# Patient Record
Sex: FEMALE | Race: WHITE | Employment: UNEMPLOYED | URBAN - METROPOLITAN AREA
[De-identification: names, ages, dates, MRNs, and addresses within clinical notes are randomized per-mention and may not be internally consistent; named-entity substitution may affect disease eponyms.]

---

## 2017-08-07 ENCOUNTER — OFFICE VISIT (OUTPATIENT)
Dept: NEUROLOGY | Age: 52
End: 2017-08-07

## 2017-08-07 VITALS — DIASTOLIC BLOOD PRESSURE: 70 MMHG | SYSTOLIC BLOOD PRESSURE: 111 MMHG

## 2017-08-07 DIAGNOSIS — M48.061 LUMBAR STENOSIS: ICD-10-CM

## 2017-08-07 DIAGNOSIS — R20.2 NUMBNESS AND TINGLING: ICD-10-CM

## 2017-08-07 DIAGNOSIS — H53.9 VISION CHANGES: ICD-10-CM

## 2017-08-07 DIAGNOSIS — R20.0 NUMBNESS AND TINGLING: ICD-10-CM

## 2017-08-07 DIAGNOSIS — M62.81 MUSCLE WEAKNESS: Primary | ICD-10-CM

## 2017-08-07 DIAGNOSIS — M48.02 CERVICAL STENOSIS OF SPINAL CANAL: ICD-10-CM

## 2017-08-07 RX ORDER — OXYCODONE AND ACETAMINOPHEN 10; 325 MG/1; MG/1
TABLET ORAL
Refills: 0 | COMMUNITY
Start: 2017-08-02 | End: 2018-02-19

## 2017-08-07 RX ORDER — MORPHINE SULFATE 30 MG/1
TABLET, FILM COATED, EXTENDED RELEASE ORAL
Refills: 0 | COMMUNITY
Start: 2017-08-02

## 2017-08-07 RX ORDER — ALPRAZOLAM 1 MG/1
TABLET ORAL
Refills: 1 | COMMUNITY
Start: 2017-07-31

## 2017-08-07 RX ORDER — DULOXETIN HYDROCHLORIDE 30 MG/1
30 CAPSULE, DELAYED RELEASE ORAL DAILY
Qty: 30 CAP | Refills: 5 | Status: SHIPPED | OUTPATIENT
Start: 2017-08-07 | End: 2017-09-06 | Stop reason: SDUPTHER

## 2017-08-07 RX ORDER — VENLAFAXINE 75 MG/1
TABLET ORAL
Refills: 1 | COMMUNITY
Start: 2017-07-21

## 2017-08-07 RX ORDER — BACLOFEN 10 MG/1
TABLET ORAL 3 TIMES DAILY
COMMUNITY
End: 2018-02-19

## 2017-08-07 RX ORDER — DEXTROAMPHETAMINE SACCHARATE, AMPHETAMINE ASPARTATE, DEXTROAMPHETAMINE SULFATE AND AMPHETAMINE SULFATE 5; 5; 5; 5 MG/1; MG/1; MG/1; MG/1
TABLET ORAL
Refills: 0 | COMMUNITY
Start: 2017-07-21

## 2017-08-07 RX ORDER — CLONAZEPAM 1 MG/1
TABLET, ORALLY DISINTEGRATING ORAL
Refills: 1 | COMMUNITY
Start: 2017-06-08

## 2017-08-07 RX ORDER — TRAZODONE HYDROCHLORIDE 100 MG/1
TABLET ORAL
Refills: 1 | COMMUNITY
Start: 2017-07-31

## 2017-08-07 RX ORDER — TRETINOIN 0.25 MG/G
CREAM TOPICAL
Refills: 0 | COMMUNITY
Start: 2017-06-01

## 2017-08-07 NOTE — MR AVS SNAPSHOT
Visit Information Date & Time Provider Department Dept. Phone Encounter #  
 8/7/2017  9:00 AM Yue Montoya MD [address] [place] [address] Neurology Clinic [phone] [id number] Upcoming Health Maintenance Date Due Hepatitis C Screening 1965 DTaP/Tdap/Td series (1 - Tdap) 8/3/1984 PAP AKA CERVICAL CYTOLOGY 8/3/1984 BREAST CANCER SCRN MAMMOGRAM 8/3/2013 FOBT Q 1 YEAR AGE 50-75 8/3/2013 INFLUENZA AGE 9 TO ADULT 8/1/2017 Allergies as of 8/7/2017  Never Reviewed Not on File Current Immunizations  Never Reviewed No immunizations on file. Not reviewed this visit You Were Diagnosed With   
  
 Codes Comments Muscle weakness    -  Primary ICD-10-CM: M62.81 ICD-9-CM: 728.87 Numbness and tingling     ICD-10-CM: R20.0, R20.2 ICD-9-CM: 782.0 Cervical stenosis of spinal canal     ICD-10-CM: M48.02 
ICD-9-CM: 723.0 Lumbar stenosis     ICD-10-CM: M48.06 
ICD-9-CM: 724.02 Vision changes     ICD-10-CM: H53.9 ICD-9-CM: 368.9 Vitals BP  
  
  
  
  
  
 111/70 Preferred Pharmacy Pharmacy Name Phone 310 [facility], [facility] 53 91 [address] ([address] [phone] Your Updated Medication List  
  
   
This list is accurate as of: 8/7/17 10:09 AM.  Always use your most recent med list.  
  
  
  
  
 ALPRAZolam 1 mg tablet Commonly known as:  Lollie Jac TK 1 T PO TID PRN  
  
 baclofen 10 mg tablet Commonly known as:  LIORESAL Take  by mouth three (3) times daily. clonazePAM 1 mg disintegrating tablet Commonly known as:  KlonoPIN  
PLACE 1 WAFTER ON TONGUE AND LET DISSOLVE TID PRN  
  
 dextroamphetamine-amphetamine 20 mg tablet Commonly known as:  ADDERALL TK 1 T PO AT 8 AM AND 1 TPO  AT NOON  
  
 DULoxetine 30 mg capsule Commonly known as:  CYMBALTA Take 1 Cap by mouth daily. morphine CR 30 mg CR tablet Commonly known as:  MS CONTIN  
TK 1 T PO BID  
  
 oxyCODONE-acetaminophen  mg per tablet Commonly known as:  PERCOCET 10 TK 1 T PO QD PRN  
  
 traZODone 100 mg tablet Commonly known as:  DESYREL  
TK 2 TS PO QHS  
  
 tretinoin 0.025 % topical cream  
Commonly known as:  RETIN-A  
  
 venlafaxine 75 mg tablet Commonly known as:  [facility] TK 2 T IN THE MORNING AND 1 T PO AT NOON Prescriptions Sent to Pharmacy Refills DULoxetine (CYMBALTA) 30 mg capsule 5 Sig: Take 1 Cap by mouth daily. Class: Normal  
 Pharmacy: [facility] [address] #: [phone] Route: Oral  
  
We Performed the Following ALDOLASE [id number] CPT(R)] CK [54424 CPT(R)]   
 LD [id number] CPT(R)] REFERRAL TO NEUROLOGY [FKT86 Custom] Comments:  
 Please do EMG/NCS of all four extremities To-Do List   
 08/08/2017 Imaging:  MRI BRAIN W WO CONT   
  
 08/08/2017 Lab:  VITAMIN D, 1, 25 DIHYDROXY Referral Information Referral ID Referred By Referred To  
  
 [id number] Tino TOBIAS MD   
   [address] Phone: [phone] Fax: [phone] Visits Status Start Date End Date 1 New Request 8/7/17 8/7/18 If your referral has a status of pending review or denied, additional information will be sent to support the outcome of this decision. Patient Instructions PRESCRIPTION REFILL POLICY [facility] Statement to Patients April 1, 2014 In an effort to ensure the large volume of patient prescription refills is processed in the most efficient and expeditious manner, we are asking our patients to assist us by calling your Pharmacy for all prescription refills, this will include also your  Mail Order Pharmacy.  The pharmacy will contact our office electronically to continue the refill process. Please do not wait until the last minute to call your pharmacy. We need at least 48 hours (2days) to fill prescriptions. We also encourage you to call your pharmacy before going to  your prescription to make sure it is ready. With regard to controlled substance prescription refill requests (narcotic refills) that need to be picked up at our office, we ask your cooperation by providing us with at least 72 hours (3days) notice that you will need a refill. We will not refill narcotic prescription refill requests after 4:00pm on any weekday, Monday through Thursday, or after 2:00pm on Fridays, or on the weekends. We encourage everyone to explore another way of getting your prescription refill request processed using [employer], our patient web portal through our electronic medical record system. [employer] is an efficient and effective way to communicate your medication request directly to the office and  downloadable as an gregorio on your smart phone . [employer] also features a review functionality that allows you to view your medication list as well as leave messages for your physician. Are you ready to get connected? If so please review the attatched instructions or speak to any of our staff to get you set up right away! Thank you so much for your cooperation. Should you have any questions please contact our Practice Administrator. The Physicians and Staff,  [facility] Patient Instructions/Plans: 
· Duloxetine (By mouth) Duloxetine (doo-LOX-e-teen) Treats depression, anxiety, diabetic peripheral neuropathy, fibromyalgia, and chronic muscle or bone pain. This medicine is an SSNRI. Brand Name(s): Cymbalta, DermacinRx DPN Kim Faulkner There may be other brand names for this medicine. When This Medicine Should Not Be Used: This medicine is not right for everyone.  Do not use it if you had an allergic reaction to duloxetine. How to Use This Medicine:  
Capsule, Delayed Release Capsule · Take your medicine as directed. Your dose may need to be changed several times to find what works best for you. · Delayed-release capsule: Swallow the capsule whole. Do not crush, chew, break, or open it. · This medicine should come with a Medication Guide. Ask your pharmacist for a copy if you do not have one. · Missed dose: Take a dose as soon as you remember. If it is almost time for your next dose, wait until then and take a regular dose. Do not take extra medicine to make up for a missed dose. · Store the medicine in a closed container at room temperature, away from heat, moisture, and direct light. Drugs and Foods to Avoid: Ask your doctor or pharmacist before using any other medicine, including over-the-counter medicines, vitamins, and herbal products. · Do not take duloxetine if you have used an MAO inhibitor (MAOI) within the past 14 days. Do not start taking an MAO inhibitor within 5 days of stopping duloxetine. · Some medicines can affect how duloxetine works. Tell your doctor if you are using any of the following: 
¨ Buspirone, cimetidine, ciprofloxacin, enoxacin, fentanyl, lithium, Kimmie's wort, theophylline, tramadol, tryptophan, or warfarin ¨ Amphetamines ¨ Blood pressure medicine ¨ Diuretic (water pill) ¨ Medicine for heart rhythm problems (including flecainide, propafenone, quinidine) ¨ Medicine to treat migraine headaches (including triptans) ¨ NSAID pain or arthritis medicine (including aspirin, celecoxib, diclofenac, ibuprofen, naproxen) ¨ Other medicine to treat depression or mood disorders (including amitriptyline, desipramine, fluoxetine, imipramine, nortriptyline, paroxetine) ¨ Phenothiazine medicine (including thioridazine) · Tell your doctor if you use anything else that makes you sleepy. Some examples are allergy medicine, narcotic pain medicine, and alcohol. · Do not drink alcohol while you are using this medicine. Warnings While Using This Medicine: · Tell your doctor if you are pregnant or breastfeeding, or if you have kidney disease, liver disease, diabetes, digestion problems, glaucoma, heart disease, high or low blood pressure, or problems with urination. Tell your doctor if you smoke or you have a history of seizures, or drug or alcohol addiction. · This medicine may cause the following problems:  
¨ Serious liver problems ¨ Serotonin syndrome (more likely when used with certain other medicines) ¨ Increased risk of bleeding problems ¨ Serious skin reactions ¨ Low sodium levels in the blood · This medicine can increase thoughts of suicide. Tell your doctor right away if you start to feel depressed and have thoughts about hurting yourself. · This medicine can cause changes in your blood pressure. This may make you dizzy or drowsy. Do not drive or do anything that could be dangerous until you know how this medicine affects you. Stand up slowly to avoid falls. · Do not stop using this medicine suddenly. Your doctor will need to slowly decrease your dose before you stop it completely. · Your doctor will check your progress and the effects of this medicine at regular visits. Keep all appointments. · Keep all medicine out of the reach of children. Never share your medicine with anyone. Possible Side Effects While Using This Medicine:  
Call your doctor right away if you notice any of these side effects: · Allergic reaction: Itching or hives, swelling in your face or hands, swelling or tingling in your mouth or throat, chest tightness, trouble breathing · Anxiety, restlessness, fever, fast heartbeat, sweating, muscle spasms, diarrhea, seeing or hearing things that are not there · Blistering, peeling, red skin rash · Confusion, weakness, muscle twitching · Dark urine or pale stools, nausea, vomiting, loss of appetite, stomach pain, yellow skin or eyes · Decrease in how much or how often you urinate · Eye pain, vision changes, seeing halos around lights · Feeling more energetic than usual 
· Lightheadedness, dizziness, or fainting · Unusual moods or behaviors, worsening depression, thoughts about hurting yourself, trouble sleeping · Unusual bleeding or bruising If you notice these less serious side effects, talk with your doctor: · Decrease in appetite or weight · Dry mouth, constipation, mild nausea · Unusual drowsiness, sleepiness, or tiredness If you notice other side effects that you think are caused by this medicine, tell your doctor. Call your doctor for medical advice about side effects. You may report side effects to FDA at 4-871-CCW-8023 © 2017 [facility] Information is for End User's use only and may not be sold, redistributed or otherwise used for commercial purposes. The above information is an  only. It is not intended as medical advice for individual conditions or treatments. Talk to your doctor, nurse or pharmacist before following any medical regimen to see if it is safe and effective for you. Introducing [facility]! Milena Callahan introduces [employer] patient portal. Now you can access parts of your medical record, email your doctor's office, and request medication refills online. 1. In your internet browser, go to [email or web address] 2. Click on the First Time User? Click Here link in the Sign In box. You will see the New Member Sign Up page. 3. Enter your [employer] Access Code exactly as it appears below. You will not need to use this code after youve completed the sign-up process. If you do not sign up before the expiration date, you must request a new code. · [employer] Access Code: [email or web address] Expires: 11/5/2017 10:09 AM 
 
4.  Enter the last four digits of your Social Security Number (xxxx) and Date of Birth (mm/dd/yyyy) as indicated and click Submit. You will be taken to the next sign-up page. 5. Create a FreeDrive ID. This will be your Free[employer] login ID and cannot be changed, so think of one that is secure and easy to remember. 6. Create a [employer] password. You can change your password at any time. 7. Enter your Password Reset Question and Answer. This can be used at a later time if you forget your password. 8. Enter your e-mail address. You will receive e-mail notification when new information is available in [address] 9. Click Sign Up. You can now view and download portions of your medical record. 10. Click the Download Summary menu link to download a portable copy of your medical information. If you have questions, please visit the Frequently Asked Questions section of the [employer] website. Remember, [employer] is NOT to be used for urgent needs. For medical emergencies, dial 911. Now available from your iPhone and Android! Please provide this summary of care documentation to your next provider. Your primary care clinician is listed as Willa Kothari. If you have any questions after today's visit, please call [phone].

## 2017-08-07 NOTE — PATIENT INSTRUCTIONS
10 [facility]   Statement to Patients  April 1, 2014      In an effort to ensure the large volume of patient prescription refills is processed in the most efficient and expeditious manner, we are asking our patients to assist us by calling your Pharmacy for all prescription refills, this will include also your  Mail Order Pharmacy. The pharmacy will contact our office electronically to continue the refill process. Please do not wait until the last minute to call your pharmacy. We need at least 48 hours (2days) to fill prescriptions. We also encourage you to call your pharmacy before going to  your prescription to make sure it is ready. With regard to controlled substance prescription refill requests (narcotic refills) that need to be picked up at our office, we ask your cooperation by providing us with at least 72 hours (3days) notice that you will need a refill. We will not refill narcotic prescription refill requests after 4:00pm on any weekday, Monday through Thursday, or after 2:00pm on Fridays, or on the weekends. We encourage everyone to explore another way of getting your prescription refill request processed using [employer], our patient web portal through our electronic medical record system. [employer] is an efficient and effective way to communicate your medication request directly to the office and  downloadable as an gregorio on your smart phone . [employer] also features a review functionality that allows you to view your medication list as well as leave messages for your physician. Are you ready to get connected? If so please review the attatched instructions or speak to any of our staff to get you set up right away! Thank you so much for your cooperation. Should you have any questions please contact our Practice Administrator.     The Physicians and Staff,  [facility]     Patient Instructions/Plans:  ·   Duloxetine (By mouth) Duloxetine (doo-LOX-e-teen)  Treats depression, anxiety, diabetic peripheral neuropathy, fibromyalgia, and chronic muscle or bone pain. This medicine is an SSNRI. Brand Name(s): Cymbalta, DermacinRx Kevin Cason   There may be other brand names for this medicine. When This Medicine Should Not Be Used: This medicine is not right for everyone. Do not use it if you had an allergic reaction to duloxetine. How to Use This Medicine:   Capsule, Delayed Release Capsule  · Take your medicine as directed. Your dose may need to be changed several times to find what works best for you. · Delayed-release capsule: Swallow the capsule whole. Do not crush, chew, break, or open it. · This medicine should come with a Medication Guide. Ask your pharmacist for a copy if you do not have one. · Missed dose: Take a dose as soon as you remember. If it is almost time for your next dose, wait until then and take a regular dose. Do not take extra medicine to make up for a missed dose. · Store the medicine in a closed container at room temperature, away from heat, moisture, and direct light. Drugs and Foods to Avoid:   Ask your doctor or pharmacist before using any other medicine, including over-the-counter medicines, vitamins, and herbal products. · Do not take duloxetine if you have used an MAO inhibitor (MAOI) within the past 14 days. Do not start taking an MAO inhibitor within 5 days of stopping duloxetine. · Some medicines can affect how duloxetine works.  Tell your doctor if you are using any of the following:  ¨ Buspirone, cimetidine, ciprofloxacin, enoxacin, fentanyl, lithium, Kimmie's wort, theophylline, tramadol, tryptophan, or warfarin  ¨ Amphetamines  ¨ Blood pressure medicine  ¨ Diuretic (water pill)  ¨ Medicine for heart rhythm problems (including flecainide, propafenone, quinidine)  ¨ Medicine to treat migraine headaches (including triptans)  ¨ NSAID pain or arthritis medicine (including aspirin, celecoxib, diclofenac, ibuprofen, naproxen)  ¨ Other medicine to treat depression or mood disorders (including amitriptyline, desipramine, fluoxetine, imipramine, nortriptyline, paroxetine)  ¨ Phenothiazine medicine (including thioridazine)  · Tell your doctor if you use anything else that makes you sleepy. Some examples are allergy medicine, narcotic pain medicine, and alcohol. · Do not drink alcohol while you are using this medicine. Warnings While Using This Medicine:   · Tell your doctor if you are pregnant or breastfeeding, or if you have kidney disease, liver disease, diabetes, digestion problems, glaucoma, heart disease, high or low blood pressure, or problems with urination. Tell your doctor if you smoke or you have a history of seizures, or drug or alcohol addiction. · This medicine may cause the following problems:   ¨ Serious liver problems  ¨ Serotonin syndrome (more likely when used with certain other medicines)  ¨ Increased risk of bleeding problems  ¨ Serious skin reactions  ¨ Low sodium levels in the blood  · This medicine can increase thoughts of suicide. Tell your doctor right away if you start to feel depressed and have thoughts about hurting yourself. · This medicine can cause changes in your blood pressure. This may make you dizzy or drowsy. Do not drive or do anything that could be dangerous until you know how this medicine affects you. Stand up slowly to avoid falls. · Do not stop using this medicine suddenly. Your doctor will need to slowly decrease your dose before you stop it completely. · Your doctor will check your progress and the effects of this medicine at regular visits. Keep all appointments. · Keep all medicine out of the reach of children. Never share your medicine with anyone.   Possible Side Effects While Using This Medicine:   Call your doctor right away if you notice any of these side effects:  · Allergic reaction: Itching or hives, swelling in your face or hands, swelling or tingling in your mouth or throat, chest tightness, trouble breathing  · Anxiety, restlessness, fever, fast heartbeat, sweating, muscle spasms, diarrhea, seeing or hearing things that are not there  · Blistering, peeling, red skin rash  · Confusion, weakness, muscle twitching  · Dark urine or pale stools, nausea, vomiting, loss of appetite, stomach pain, yellow skin or eyes  · Decrease in how much or how often you urinate  · Eye pain, vision changes, seeing halos around lights  · Feeling more energetic than usual  · Lightheadedness, dizziness, or fainting  · Unusual moods or behaviors, worsening depression, thoughts about hurting yourself, trouble sleeping  · Unusual bleeding or bruising  If you notice these less serious side effects, talk with your doctor:   · Decrease in appetite or weight  · Dry mouth, constipation, mild nausea  · Unusual drowsiness, sleepiness, or tiredness  If you notice other side effects that you think are caused by this medicine, tell your doctor. Call your doctor for medical advice about side effects. You may report side effects to FDA at 7-740-FDA-2278  © 2017 [facility] Information is for End User's use only and may not be sold, redistributed or otherwise used for commercial purposes. The above information is an  only. It is not intended as medical advice for individual conditions or treatments. Talk to your doctor, nurse or pharmacist before following any medical regimen to see if it is safe and effective for you.

## 2017-08-07 NOTE — PROGRESS NOTES
Patient says she has lost feeling in both of her legs. Says she has constant numbness and tingling in her arms and hands. She states her symptoms started in 2009. She has had a pain stimulator placed in 2013 for back pain but she feels it has damaged her legs. She states she is out of pain medicine. She has recently missed her appointment with her pain management dr and is unable to be seen again until May.
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
## 2017-08-08 DIAGNOSIS — R20.2 NUMBNESS AND TINGLING: ICD-10-CM

## 2017-08-08 DIAGNOSIS — H53.9 VISION CHANGES: ICD-10-CM

## 2017-08-08 DIAGNOSIS — M48.02 CERVICAL STENOSIS OF SPINAL CANAL: ICD-10-CM

## 2017-08-08 DIAGNOSIS — M48.061 LUMBAR STENOSIS: ICD-10-CM

## 2017-08-08 DIAGNOSIS — M62.81 MUSCLE WEAKNESS: ICD-10-CM

## 2017-08-08 DIAGNOSIS — R20.0 NUMBNESS AND TINGLING: ICD-10-CM

## 2017-08-16 ENCOUNTER — ED HISTORICAL/CONVERTED ENCOUNTER (OUTPATIENT)
Dept: OTHER | Age: 52
End: 2017-08-16

## 2017-08-21 ENCOUNTER — HOSPITAL ENCOUNTER (OUTPATIENT)
Dept: MRI IMAGING | Age: 52
Discharge: HOME | End: 2017-08-21
Attending: PSYCHIATRY & NEUROLOGY

## 2017-08-21 DIAGNOSIS — M48.02 CERVICAL STENOSIS OF SPINAL CANAL: ICD-10-CM

## 2017-08-21 DIAGNOSIS — R20.0 NUMBNESS AND TINGLING: ICD-10-CM

## 2017-08-21 DIAGNOSIS — M62.81 MUSCLE WEAKNESS: ICD-10-CM

## 2017-08-21 DIAGNOSIS — R20.2 NUMBNESS AND TINGLING: ICD-10-CM

## 2017-08-21 DIAGNOSIS — H53.9 VISION CHANGES: ICD-10-CM

## 2017-08-21 DIAGNOSIS — M48.061 LUMBAR STENOSIS: ICD-10-CM

## 2017-08-30 ENCOUNTER — OFFICE VISIT (OUTPATIENT)
Dept: NEUROLOGY | Age: 52
End: 2017-08-30

## 2017-08-30 DIAGNOSIS — M54.12 RADICULOPATHY, CERVICAL: Primary | ICD-10-CM

## 2017-08-30 DIAGNOSIS — M47.27 LUMBOSACRAL RADICULOPATHY DUE TO DEGENERATIVE JOINT DISEASE OF SPINE: ICD-10-CM

## 2017-08-30 DIAGNOSIS — G58.9 ENTRAPMENT NEUROPATHY: ICD-10-CM

## 2017-08-30 NOTE — PROGRESS NOTES
EMG/ NCS Report  \[facility]\"", [address]  Ph: [phone]  FAX: [phone]/ [phone]  Test Date:  2017    Patient: Jai Grajeda : 1965 Physician: Jeremy Roa, Sangeeta Medina MD   Sex: Female Height: ' \" Ref PhysEather Fraction   ID#: [id number] Weight:  lbs. Technician: Sukh Euceda     Patient History / Exam:  CC:NUMBNESS,TINGLING ARMS/LEGS. EMG & NCV Findings:  Evaluation of the left Fibular motor nerve showed normal distal onset latency (3.3 ms), normal amplitude (6.3 mV), normal conduction velocity (B Fib-Ankle, 49 m/s), and normal conduction velocity (Poplt-B Fib, 67 m/s). The right Fibular motor nerve showed normal distal onset latency (2.3 ms), reduced amplitude (1.6 mV), normal conduction velocity (B Fib-Ankle, 46 m/s), and normal conduction velocity (Poplt-B Fib, 59 m/s). The left median motor nerve showed normal distal onset latency (3.9 ms), reduced amplitude (3.9 mV), and normal conduction velocity (Elbow-Wrist, 56 m/s). The right median motor nerve showed normal distal onset latency (4.3 ms), normal amplitude (6.4 mV), and normal conduction velocity (Elbow-Wrist, 53 m/s). The left tibial motor and the right tibial motor nerves showed normal distal onset latency (L4.5, R5.2 ms), normal amplitude (L9.3, R11.2 mV), and normal conduction velocity (Knee-Ankle, L51, R52 m/s). The left ulnar motor nerve showed normal distal onset latency (2.5 ms), reduced amplitude (6.4 mV), normal conduction velocity (B Elbow-Wrist, 65 m/s), and normal conduction velocity (A Elbow-B Elbow, 53 m/s). The right ulnar motor nerve showed prolonged distal onset latency (3.9 ms), normal amplitude (8.3 mV), normal conduction velocity (B Elbow-Wrist, 60 m/s), and decreased conduction velocity (A Elbow-B Elbow, 48 m/s).   The left median sensory, the right median sensory, the left radial sensory, the right radial sensory, the left sural sensory, the right sural sensory, the left ulnar sensory, and the right ulnar sensory nerves showed normal distal peak latency (L3.0, R3.6, L2.2, R2.0, L3.4, R2.8, L3.0, R3.1 ms) and normal amplitude (L65.7, R33.0, L56.1, R62.5, L21.9, R17.3, L26.4, R40.5 µV). All left vs. right side differences were within normal limits. All F Wave latencies were within normal limits. All F Wave left vs. right side latency differences were within normal limits. All H Reflex left vs. right side latency differences were within normal limits. All examined muscles (as indicated in the following table) showed no evidence of electrical instability.         Impression:        ___________________________  Tania Solis IV, MD      Nerve Conduction Studies  Anti Sensory Summary Table     Stim Site NR Peak (ms) Norm Peak (ms) P-T Amp (µV) Norm P-T Amp Site1 Site2 Dist (cm)   Left Median Anti Sensory (2nd Digit)  32.8°C   Wrist    3.0 <4 65.7 >13 Wrist 2nd Digit 14.0   Right Median Anti Sensory (2nd Digit)  31.1°C   Wrist    3.6 <4 33.0 >13 Wrist 2nd Digit 14.0   Elbow    3.6  34.6  Elbow Wrist 0.0   Left Radial Anti Sensory (Base 1st Digit)  31.6°C   Wrist    2.2 <2.8 56.1 >11 Wrist Base 1st Digit 10.0   Right Radial Anti Sensory (Base 1st Digit)  31.5°C   Wrist    2.0 <2.8 62.5 >11 Wrist Base 1st Digit 10.0   Left Sural Anti Sensory (Lat Mall)  32.5°C   Calf    3.4 <4.5 21.9 >4.0 Calf Lat Mall 14.0   Site 2    3.4  20.8       Right Sural Anti Sensory (Lat Mall)  33.7°C   Calf    2.8 <4.5 17.3 >4.0 Calf Lat Mall 14.0   Site 2    2.9  18.4       Left Ulnar Anti Sensory (5th Digit)  32.1°C   Wrist    3.0 <4.0 26.4 >9 Wrist 5th Digit 14.0   Right Ulnar Anti Sensory (5th Digit)  31.5°C   Wrist    3.1 <4.0 40.5 >9 Wrist 5th Digit 14.0     Motor Summary Table     Stim Site NR Onset (ms) Norm Onset (ms) O-P Amp (mV) Norm O-P Amp Amp (Prev) (%) Site1 Site2 Dist (cm) Gordy (m/s) Norm Gordy (m/s)   Left Fibular Motor (Ext Dig Brev)  31.8°C   Ankle    3.3 <6.5 6. 3 >2.6 100.0 Ankle Ext Dig Brev 8.0     B Fib    9.2  4.8  76.2 B Fib Ankle 29.0 49 >38   Poplt    10.7  4.8  100.0 Poplt B Fib 10.0 67 >42   Right Fibular Motor (Ext Dig Brev)  33°C   Ankle    2.3 <6.5 1.6 >2.6 100.0 Ankle Ext Dig Brev 8.0     B Fib    8.4  1.1  68.8 B Fib Ankle 28.0 46 >38   Poplt    10.1  1.0  90.9 Poplt B Fib 10.0 59 >42   Left Median Motor (Abd Poll Brev)  31.2°C   Wrist    3.9 <4.5 3.9 >4.1 100.0 Wrist Abd Poll Brev 8.0     Elbow    7.3  2.7  69.2 Elbow Wrist 19.0 56 >49   Right Median Motor (Abd Poll Brev)  31.5°C   Wrist    4.3 <4.5 6.4 >4.1 100.0 Wrist Abd Poll Brev 8.0     Elbow    8.2  5.6  87.5 Elbow Wrist 20.5 53 >49   Left Tibial Motor (Abd Corbett Brev)  31.1°C   Ankle    4.5 <6.1 9.3 >5.3 100.0 Ankle Abd Corbett Brev 8.0     Knee    11.6  7.6  81.7 Knee Ankle 36.0 51 >39   Right Tibial Motor (Abd Corbett Brev)  32°C   Ankle    5.2 <6.1 11.2 >5.3 100.0 Ankle Abd Corbett Brev 8.0     Knee    12.3  9.3  83.0 Knee Ankle 37.0 52 >39   Left Ulnar Motor (Abd Dig Minimi)  30.9°C   Wrist    2.5 <3.1 6.4 >7.0 100.0 Wrist Abd Dig Minimi 8.0  >50   B Elbow    5.6  6.0  93.8 B Elbow Wrist 20.0 65 >50   A Elbow    7.5  6.0  100.0 A Elbow B Elbow 10.0 53 >50   Right Ulnar Motor (Abd Dig Minimi)  30.6°C   Wrist    3.9 <3.1 8.3 >7.0 100.0 Wrist Abd Dig Minimi 8.0  >50   B Elbow    6.9  8.7  104.8 B Elbow Wrist 18.0 60 >50   A Elbow    9.0  8.4  96.6 A Elbow B Elbow 10.0 48 >50     F Wave Studies     NR F-Lat (ms) Lat Norm (ms) L-R F-Lat (ms) L-R Lat Norm   Left Tibial (Mrkrs) (Abd Hallucis)  30.8°C      44.67 <56 4.67 <5.7   Right Tibial (Mrkrs) (Abd Hallucis)  31.8°C      40.00 <56 4.67 <5.7   Left Ulnar (Mrkrs) (Abd Dig Min)  30.5°C      26.51 <32 0.00 <2.5   Right Ulnar (Mrkrs) (Abd Dig Min)  30.2°C      26.51 <32 0.00 <2.5     H Reflex Studies     NR H-Lat (ms) L-R H-Lat (ms) L-R Lat Norm   Left Tibial (Gastroc)  30.5°C      40.00 0.00 <2.0   Right Tibial (Gastroc)  33.6°C      40.00 0.00 <2.0     EMG Side Muscle Nerve Root Ins Act Fibs Psw Recrt Duration Amp Poly Comment   Right Abd Poll Brev Median C8-T1 Nml Nml Nml Nml Nml Nml Nml    Right 1stDorInt Ulnar C8-T1 Nml Nml Nml Nml Nml Nml Nml    Right FlexCarpiUln Ulnar C8,T1 Nml Nml Nml Nml Nml Nml Nml    Right BrachioRad Radial C5-6 Nml Nml Nml Nml Nml Nml Nml    Right Triceps Radial C6-7-8 Nml Nml Nml Nml Nml Nml Nml    Right Biceps Musculocut C5-6 Nml Nml Nml Nml Nml Nml Nml    Right Deltoid Axillary C5-6 Nml Nml Nml Nml Nml Nml Nml    Right Ext Dig Brev Dp Br Peron L5, S1 Nml Nml Nml Nml Nml Nml Nml    Right AntTibialis Dp Br Peron L4-5 Nml Nml Nml Nml Nml Nml Nml    Right MedGastroc Tibial S1-2 Nml Nml Nml Nml Nml Nml Nml    Right VastusMed Femoral L2-4 Nml Nml Nml Nml Nml Nml Nml    Right BicepsFemL Sciatic L5-S2 Nml Nml Nml Nml Nml Nml Nml    Left Ext Dig Brev Dp Br Peron L5, S1 Nml Nml Nml Nml Nml Nml Nml    Left AntTibialis Dp Br Peron L4-5 Nml Nml Nml Nml Nml Nml Nml    Left MedGastroc Tibial S1-2 Nml Nml Nml Nml Nml Nml Nml    Left VastusMed Femoral L2-4 Nml Nml Nml Nml Nml Nml Nml    Left BicepsFemL Sciatic L5-S2 Nml Nml Nml Nml Nml Nml Nml    Left Mid Lumb Parasp Rami L4,5 Nml Nml Nml Nml Nml Nml Nml    Right Lower Lumb Parasp Rami L5,S1 Nml Nml Nml Nml Nml Nml Nml    Left Mid Cerv Parasp Rami C5,6 Nml Nml Nml Nml Nml Nml Nml    Right Mid Cerv Parasp Rami C5,6 Nml Nml Nml Nml Nml Nml Nml    Left 1stDorInt Ulnar C8-T1 Nml Nml Nml Nml Nml Nml Nml    Left BrachioRad Radial C5-6 Nml Nml Nml Nml Nml Nml Nml    Left Triceps Radial C6-7-8 Nml Nml Nml Nml Nml Nml Nml    Left Biceps Musculocut C5-6 Nml Nml Nml Nml Nml Nml Nml    Left Deltoid Axillary C5-6 Nml Nml Nml Nml Nml Nml Nml                Nerve Conduction Studies  Anti Sensory Left/Right Comparison     Stim Site L Lat (ms) R Lat (ms) L-R Lat (ms) L Amp (µV) R Amp (µV) L-R Amp (%) Site1 Site2 L Gordy (m/s) R Gordy (m/s) L-R Gordy (m/s)   Median Anti Sensory (2nd Digit)  32.8°C   Wrist 2.5 3.0 0.5 65.7 33.0 49.8 Wrist 2nd Digit 56 47 9   Radial Anti Sensory (Base 1st Digit)  31.6°C   Wrist 1.4 1.6 0.2 56.1 62.5 10.2 Wrist Base 1st Digit 71 63 8   Sural Anti Sensory (Lat Mall)  32.5°C   Calf 2.9 2.1 0.8 21.9 17.3 21.0 Calf Lat Mall 48 67 19   Site 2 2.7 2.3 0.4 20.8 18.4 11.5        Ulnar Anti Sensory (5th Digit)  32.1°C   Wrist 2.5 2.5 0.0 26.4 40.5 34.8 Wrist 5th Digit 56 56 0     Motor Left/Right Comparison     Stim Site L Lat (ms) R Lat (ms) L-R Lat (ms) L Amp (mV) R Amp (mV) L-R Amp (%) Site1 Site2 L Gordy (m/s) R Gordy (m/s) L-R Gordy (m/s)   Fibular Motor (Ext Dig Brev)  31.8°C   Ankle 3.3 2.3 1.0 6.3 1.6 74.6 Ankle Ext Dig Brev      B Fib 9.2 8.4 0.8 4.8 1.1 77.1 B Fib Ankle 49 46 3   Poplt 10.7 10.1 0.6 4.8 1.0 79.2 Poplt B Fib 67 59 8   Median Motor (Abd Poll Brev)  31.2°C   Wrist 3.9 4.3 0.4 3.9 6.4 39.1 Wrist Abd Poll Brev      Elbow 7.3 8.2 0.9 2.7 5.6 51.8 Elbow Wrist 56 53 3   Tibial Motor (Abd Corbett Brev)  31.1°C   Ankle 4.5 5.2 0.7 9.3 11.2 17.0 Ankle Abd Corbett Brev      Knee 11.6 12.3 0.7 7.6 9.3 18.3 Knee Ankle 51 52 1   Ulnar Motor (Abd Dig Minimi)  30.9°C   Wrist 2.5 3.9 1.4 6.4 8.3 22.9 Wrist Abd Dig Minimi      B Elbow 5.6 6.9 1.3 6.0 8.7 31.0 B Elbow Wrist 65 60 5   A Elbow 7.5 9.0 1.5 6.0 8.4 28.6 A Elbow B Elbow 53 48 5         Waveforms:
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
## 2017-09-06 ENCOUNTER — OFFICE VISIT (OUTPATIENT)
Dept: NEUROLOGY | Age: 52
End: 2017-09-06

## 2017-09-06 VITALS — RESPIRATION RATE: 20 BRPM | DIASTOLIC BLOOD PRESSURE: 64 MMHG | WEIGHT: 162.4 LBS | SYSTOLIC BLOOD PRESSURE: 128 MMHG

## 2017-09-06 DIAGNOSIS — H53.9 VISION CHANGES: ICD-10-CM

## 2017-09-06 DIAGNOSIS — M48.061 LUMBAR STENOSIS: ICD-10-CM

## 2017-09-06 DIAGNOSIS — M62.81 MUSCLE WEAKNESS: ICD-10-CM

## 2017-09-06 DIAGNOSIS — M48.02 CERVICAL STENOSIS OF SPINAL CANAL: ICD-10-CM

## 2017-09-06 DIAGNOSIS — R20.0 NUMBNESS AND TINGLING: Primary | ICD-10-CM

## 2017-09-06 DIAGNOSIS — R20.2 NUMBNESS AND TINGLING: Primary | ICD-10-CM

## 2017-09-06 RX ORDER — NAPROXEN SODIUM 220 MG
220 TABLET ORAL 2 TIMES DAILY WITH MEALS
COMMUNITY

## 2017-09-06 RX ORDER — DULOXETIN HYDROCHLORIDE 30 MG/1
30 CAPSULE, DELAYED RELEASE ORAL DAILY
Qty: 30 CAP | Refills: 5 | Status: SHIPPED | OUTPATIENT
Start: 2017-09-06 | End: 2018-02-19

## 2017-09-06 NOTE — PROGRESS NOTES
Neurology Progress Note    Patient ID:  Ish Lakhani  [id number]  45 y.o.  1965    HISTORY PROVIDED BY:  Patient      Chief Complaint: Numbness  Subjective:    Ms. Anahi Kerns is here for follow up today of numbness and pain. Patient had an EMG/NCS since her last visit of all 4 extremities. This was negative. She did have decreased recruitment due to lack of effort on the muscle part of the examination. Patient unable to get an MRI due to her pain stimulator. Patient reports her last imaging with myelogram in 2015 which she had after losing some bowel and bladder function. She has had surgery in the past.  She did have post pain stimulator placed by Dr. Dillon Woo several years ago. This did not help her at all. She feels like it gave her nerve damage in her legs. Last visit I did prescribe Cymbalta but the pharmacist would not let her have it saying that she needed to check with me first.  Patient is taking Effexor 150 mg twice daily, Klonopin, and Xanax. Discussed with her that we does have to do a low-dose of the Cymbalta. Also discussed serotonin syndrome and what to look for. Patient reports she still having issues where she will have complete numbness in her arms and legs and they will get numb to the point that she will fall. She does not understand how her EMG could be normal.  Patient is also trying to establish with a new pain management doctor. She states that she is out of her oxycodone and going to run out of her morphine. She sees them on the 13th. Patient continues to report vision changes. She feels like her distance reading is impaired.     Objective:   ROS:  Per HPI-  Otherwise 12 point ROS was negative    Meds:  Current Outpatient Prescriptions on File Prior to Visit   Medication Sig Dispense Refill    ALPRAZolam (XANAX) 1 mg tablet TK 1 T PO TID PRN  1    morphine CR (MS CONTIN) 30 mg CR tablet TK 1 T PO BID  0    traZODone (DESYREL) 100 mg tablet TK 2 TS PO QHS  1    venlafaxine (EFFEXOR) 75 mg tablet TK 2 T IN THE MORNING AND 1 T PO AT NOON  1    dextroamphetamine-amphetamine (ADDERALL) 20 mg tablet TK 1 T PO AT 8 AM AND 1 TPO  AT NOON  0    baclofen (LIORESAL) 10 mg tablet Take  by mouth three (3) times daily.  clonazePAM (KLONOPIN) 1 mg disintegrating tablet PLACE 1 WAFTER ON TONGUE AND LET DISSOLVE TID PRN  1    oxyCODONE-acetaminophen (PERCOCET 10)  mg per tablet TK 1 T PO QD PRN  0    tretinoin (RETIN-A) 0.025 % topical cream   0    DULoxetine (CYMBALTA) 30 mg capsule Take 1 Cap by mouth daily. 30 Cap 5     No current facility-administered medications on file prior to visit.         Imaging:  No new imaging  EMG/NCS: Negative  Reviewed records in [employer] tab today    Lab Review   Results for orders placed or performed during the hospital encounter of 03/02/10   CULTURE, BLOOD, PAIRED   Result Value Ref Range    Specimen Description: BLOOD     Special Requests: NO SPECIAL REQUESTS     Culture result: NO GROWTH 5 DAYS     Report Status [id number] FINAL    HGB & HCT   Result Value Ref Range    HGB 9.9 (L) 11.5 - 16.0 g/dL    HCT 29.7 (L) 35.0 - 47.0 %   HGB & HCT   Result Value Ref Range    HGB 8.5 (L) 11.5 - 16.0 g/dL    HCT 26.1 (L) 35.0 - 47.0 %   HGB & HCT   Result Value Ref Range    HGB 9.7 (L) 11.5 - 16.0 g/dL    HCT 29.5 (L) 35.0 - 47.0 %   URINALYSIS W/ REFLEX CULTURE   Result Value Ref Range    Color YELLOW     Appearance CLEAR     Specific gravity 1.015 1.003 - 1.030      pH (UA) 6.0 5.0 - 8.0      Protein NEGATIVE  NEGATIVE MG/DL    Glucose NEGATIVE  NEGATIVE MG/DL    Ketone NEGATIVE  NEGATIVE MG/DL    Bilirubin NEGATIVE  NEGATIVE    Blood NEGATIVE  NEGATIVE    Urobilinogen 0.2 0.2 - 1.0 EU/DL    Nitrites NEGATIVE  NEGATIVE    Leukocyte Esterase NEGATIVE  NEGATIVE    UA:UC IF INDICATED CULTURE NOT INDICATED BY UA RESULT     WBC 0-4 0 - 4 /HPF    RBC 0-3 0 - 5 /HPF    Epithelial cells 0-5 0 - 5 /LPF    Bacteria NEGATIVE  NEGATIVE /HPF    Hyaline cast 0-2 0 - 2   INFLUENZA A & B AG   Result Value Ref Range    Influenza A Antigen NEGATIVE  NEGATIVE    Influenza B Antigen NEGATIVE  NEGATIVE   METABOLIC PANEL, COMPREHENSIVE   Result Value Ref Range    Sodium 143 136 - 145 MMOL/L    Potassium 3.4 (L) 3.5 - 5.1 MMOL/L    Chloride 105 97 - 108 MMOL/L    CO2 30 21 - 32 MMOL/L    Anion gap 8 5 - 15 mmol/L    Glucose 141 (H) 50 - 100 MG/DL    BUN 4 (L) 6 - 20 MG/DL    Creatinine 0.9 0.6 - 1.3 MG/DL    BUN/Creatinine ratio 4 (L) 12 - 20      GFR est AA >60 >60 ml/min/1.73m2    GFR est non-AA >60 >60 ml/min/1.73m2    Calcium 8.0 (L) 8.5 - 10.1 MG/DL    Bilirubin, total 0.2 0.2 - 1.0 MG/DL    ALT (SGPT) 32 30 - 65 U/L    AST (SGOT) 26 15 - 37 U/L    Alk. phosphatase 56 50 - 136 U/L    Protein, total 5.2 (L) 6.4 - 8.2 g/dL    Albumin 2.7 (L) 3.5 - 5.0 g/dL    Globulin 2.5 2.0 - 4.0 g/dL    A-G Ratio 1.1 1.1 - 2.2     CBC W/O DIFF   Result Value Ref Range    WBC 10.9 3.6 - 11.0 K/uL    RBC 2.59 (L) 3.80 - 5.20 M/uL    HGB 8.3 (L) 11.5 - 16.0 g/dL    HCT 25.1 (L) 35.0 - 47.0 %    MCV 96.9 80.0 - 99.0 FL    MCH 32.0 26.0 - 34.0 PG    MCHC 33.1 30.0 - 36.5 g/dL    RDW 13.4 11.5 - 14.5 %    PLATELET 754 919 - 119 K/uL   CBC W/ AUTOMATED DIFF   Result Value Ref Range    WBC 10.6 3.6 - 11.0 K/uL    RBC 2.70 (L) 3.80 - 5.20 M/uL    HGB 8.7 (L) 11.5 - 16.0 g/dL    HCT 25.7 (L) 35.0 - 47.0 %    MCV 95.2 80.0 - 99.0 FL    MCH 32.2 26.0 - 34.0 PG    MCHC 33.9 30.0 - 36.5 g/dL    RDW 12.7 11.5 - 14.5 %    PLATELET 158 328 - 319 K/uL    NEUTROPHILS 67 32 - 75 %    LYMPHOCYTES 15 12 - 49 %    MONOCYTES 13 5 - 13 %    EOSINOPHILS 5 0 - 7 %    BASOPHILS 0 0 - 1 %    ABS. NEUTROPHILS 7.1 1.8 - 8.0 K/UL    ABS. LYMPHOCYTES 1.6 0.8 - 3.5 K/UL    ABS. MONOCYTES 1.4 (H) 0.0 - 1.0 K/UL    ABS. EOSINOPHILS 0.5 (H) 0.0 - 0.4 K/UL    ABS. BASOPHILS 0.0 0.0 - 0.1 K/UL     EMG/NCS:  Findings:    1.   EMG assessment of all 4 extremities and affiliated paraspinals failed to confidently delineate acute denervation or chronic denervation/reinnervation features or myopathic potentials. The uniform theme among motor recruitment was no realistic or sustained recruitment effort obtained at any of the muscle encounters. At best, muscle recruitment was easily less than 20%. 2.  Nerve conduction portion was normal except for the isolated finding of slight delay in the right ulnar motor latency at the wrist and diminished nerve conduction velocity in the same ulnar nerve above elbow segment with maintenance of motor amplitude above and below elbow. Impression:    1. No clearly defined neuropathic or myopathic disease uncovered with suboptimal patient effort at all muscles tested. 2.  Subtle conduction block of right ulnar motor nerve at the wrist and a possible suggestion of right ulnar nerve compression in the above elbow segment of uncertain if any significance. Exam:  Visit Vitals    /64    Resp 20    Wt 73.7 kg (162 lb 6.4 oz)     Gen: Well developed  CV: RRR  Lungs: non labored breathing  Abd: non distending  Neuro: A&O x 3, no dysarthria or aphasia  CN II-XII: Right pupil 5 mm, left pupil 3 mm this is the same in light and dark, EOMI, face symmetric, tongue/palate midline  Motor: strength 5/5 all four ext  Sensory: intact to LT  Gait: Wide-based    Assessment:   Nadeem Soto is a 45 y.o. female who presents for follow up of generalized numbness. 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  Patient is also having balance issues secondary to this. Since we are unable to do an MRI to evaluate for demyelinating disease, will do CT scans. Will do a CT of the head, C-spine, and L-spine. The lumbar spine is to evaluate for her back pain and balance issues. We will also start patient on Cymbalta today. On neuro exam patient does have anisocoria but it is the same in light and dark. I suspect this is physiologic but will do a CT of the head to evaluate for any aneurysm.     Plan: 1.  CT head/C-spine/L-spine  2. Start Cymbalta 30 mg daily. Side effects discussed. Serotonin syndrome discussed. Information given to patient. 3.  Encouraged patient to have a follow-up with her eye doctor  4. Encouraged patient to keep her pain management appointment as I do not do narcotics  5. Evaluate anisocoria with CT of the head. Patient does have a history of head trauma. Follow-up 6 weeks    Signed:  Susan Lozada MD  9/6/2017    This note was created using voice recognition software. Despite editing, there may be syntax errors. This note will not be viewable in [address]

## 2017-09-06 NOTE — MR AVS SNAPSHOT
Visit Information Date & Time Provider Department Dept. Phone Encounter #  
 9/6/2017 11:40 AM [facility] [employer] [phone] [id number] Upcoming Health Maintenance Date Due Hepatitis C Screening 1965 DTaP/Tdap/Td series (1 - Tdap) 8/3/1984 PAP AKA CERVICAL CYTOLOGY 8/3/1984 BREAST CANCER SCRN MAMMOGRAM 8/3/2013 FOBT Q 1 YEAR AGE 50-75 8/3/2013 INFLUENZA AGE 9 TO ADULT 8/1/2017 Allergies as of 9/6/2017  Review Complete On: 8/30/2017 By: Elizabeth Bruce MD  
 Not on File Current Immunizations  Never Reviewed No immunizations on file. Not reviewed this visit You Were Diagnosed With   
  
 Codes Comments Muscle weakness     ICD-10-CM: M62.81 ICD-9-CM: 728.87 Numbness and tingling     ICD-10-CM: R20.0, R20.2 ICD-9-CM: 782.0 Cervical stenosis of spinal canal     ICD-10-CM: M48.02 
ICD-9-CM: 723.0 Lumbar stenosis     ICD-10-CM: M48.06 
ICD-9-CM: 724.02 Vision changes     ICD-10-CM: H53.9 ICD-9-CM: 368.9 Vitals BP Resp Weight(growth percentile) 128/64 20 162 lb 6.4 oz (73.7 kg) Vitals History Preferred Pharmacy Pharmacy Name Phone [facility], [address] ([phone] Your Updated Medication List  
  
   
This list is accurate as of: 9/6/17 12:27 PM.  Always use your most recent med list.  
  
  
  
  
 ALEVE 220 mg tablet Generic drug:  naproxen sodium Take 220 mg by mouth two (2) times daily (with meals). ALPRAZolam 1 mg tablet Commonly known as:  Don  TK 1 T PO TID PRN  
  
 baclofen 10 mg tablet Commonly known as:  LIORESAL Take  by mouth three (3) times daily. clonazePAM 1 mg disintegrating tablet Commonly known as:  KlonoPIN  
PLACE 1 WAFTER ON TONGUE AND LET DISSOLVE TID PRN  
  
 dextroamphetamine-amphetamine 20 mg tablet Commonly known as:  ADDERALL TK 1 T PO AT 8 AM AND 1 TPO  AT NOON  
  
 DULoxetine 30 mg capsule Commonly known as:  CYMBALTA Take 1 Cap by mouth daily. morphine CR 30 mg CR tablet Commonly known as:  MS CONTIN  
TK 1 T PO BID  
  
 oxyCODONE-acetaminophen  mg per tablet Commonly known as:  PERCOCET 10 TK 1 T PO QD PRN  
  
 traZODone 100 mg tablet Commonly known as:  DESYREL  
TK 2 TS PO QHS  
  
 tretinoin 0.025 % topical cream  
Commonly known as:  RETIN-A  
  
 venlafaxine 75 mg tablet Commonly known as:  [facility] TK 2 T IN THE MORNING AND 1 T PO AT NOON Prescriptions Sent to Pharmacy Refills DULoxetine (CYMBALTA) 30 mg capsule 5 Sig: Take 1 Cap by mouth daily. Class: Normal  
 Pharmacy: [facility] [address] #: [phone] Route: Oral  
  
To-Do List   
 09/07/2017 Imaging:  CT HEAD WO CONT   
  
 09/07/2017 Imaging:  CT SPINE CERV WO CONT   
  
 09/07/2017 Imaging:  CT SPINE LUMB WO CONT Patient Instructions Keyanna Serrano [id number] What is a living will? A living will is a legal form you use to write down the kind of care you want at the end of your life. It is used by the health professionals who will treat you if you aren't able to decide for yourself. If you put your wishes in writing, your loved ones and others will know what kind of care you want. They won't need to guess. This can ease your mind and be helpful to others. A living will is not the same as an estate or property will. An estate will explains what you want to happen with your money and property after you die. Is a living will a legal document? A living will is a legal document. Each state has its own laws about living newman. If you move to another state, make sure that your living will is legal in the state where you now live.  Or you might use a universal form that has been approved by many states. This kind of form can sometimes be completed and stored online. Your electronic copy will then be available wherever you have a connection to the Internet. In most cases, doctors will respect your wishes even if you have a form from a different state. · You don't need an  to complete a living will. But legal advice can be helpful if your state's laws are unclear, your health history is complicated, or your family can't agree on what should be in your living will. · You can change your living will at any time. Some people find that their wishes about end-of-life care change as their health changes. · In addition to making a living will, think about completing a medical power of  form. This form lets you name the person you want to make end-of-life treatment decisions for you (your \"health care agent\") if you're not able to. Many hospitals and nursing homes will give you the forms you need to complete a living will and a medical power of . · Your living will is used only if you can't make or communicate decisions for yourself anymore. If you become able to make decisions again, you can accept or refuse any treatment, no matter what you wrote in your living will. · Your state may offer an online registry. This is a place where you can store your living will online so the doctors and nurses who need to treat you can find it right away. What should you think about when creating a living will? Talk about your end-of-life wishes with your family members and your doctor. Let them know what you want. That way the people making decisions for you won't be surprised by your choices. Think about these questions as you make your living will: · Do you know enough about life support methods that might be used?  If not, talk to your doctor so you know what might be done if you can't breathe on your own, your heart stops, or you're unable to swallow. · What things would you still want to be able to do after you receive life-support methods? Would you want to be able to walk? To speak? To eat on your own? To live without the help of machines? · If you have a choice, where do you want to be cared for? In your home? At a hospital or nursing home? · Do you want certain Holiness practices performed if you become very ill? · If you have a choice at the end of your life, where would you prefer to die? At home? In a hospital or nursing home? Somewhere else? · Would you prefer to be buried or cremated? · Do you want your organs to be donated after you die? What should you do with your living will? · Make sure that your family members and your health care agent have copies of your living will. · Give your doctor a copy of your living will to keep in your medical record. If you have more than one doctor, make sure that each one has a copy. · You may want to put a copy of your living will where it can be easily found. Where can you learn more? Go to http://mahi-mara.info/. Enter Y413 in the search box to learn more about \"Learning About Living Perromike. \" Current as of: August 8, 2016 Content Version: 11.3 © 3608-1278 [employer]. Care instructions adapted under license by [employer] (which disclaims liability or warranty for this information). If you have questions about a medical condition or this instruction, always ask your healthcare professional. [place] [address] any warranty or liability for your use of this information. Advance Directives: Care Instructions Your Care Instructions An advance directive is a legal way to state your wishes at the end of your life. It tells your family and your doctor what to do if you can no longer say what you want. There are two main types of advance directives. You can change them any time that your wishes change. · A living will tells your family and your doctor your wishes about life support and other treatment. · A durable power of  for health care lets you name a person to make treatment decisions for you when you can't speak for yourself. This person is called a health care agent. If you do not have an advance directive, decisions about your medical care may be made by a doctor or a  who doesn't know you. It may help to think of an advance directive as a gift to the people who care for you. If you have one, they won't have to make tough decisions by themselves. Follow-up care is a key part of your treatment and safety. Be sure to make and go to all appointments, and call your doctor if you are having problems. It's also a good idea to know your test results and keep a list of the medicines you take. How can you care for yourself at home? · Discuss your wishes with your loved ones and your doctor. This way, there are no surprises. · Many states have a unique form. Or you might use a universal form that has been approved by many states. This kind of form can sometimes be completed and stored online. Your electronic copy will then be available wherever you have a connection to the Internet. In most cases, doctors will respect your wishes even if you have a form from a different state. · You don't need a  to do an advance directive. But you may want to get legal advice. · Think about these questions when you prepare an advance directive: ¨ Who do you want to make decisions about your medical care if you are not able to? Many people choose a family member or close friend. ¨ Do you know enough about life support methods that might be used? If not, talk to your doctor so you understand. ¨ What are you most afraid of that might happen?  You might be afraid of having pain, losing your independence, or being kept alive by machines. ¨ Where would you prefer to die? Choices include your home, a hospital, or a nursing home. ¨ Would you like to have information about hospice care to support you and your family? ¨ Do you want to donate organs when you die? ¨ Do you want certain Gnosticist practices performed before you die? If so, put your wishes in the advance directive. · Read your advance directive every year, and make changes as needed. When should you call for help? Be sure to contact your doctor if you have any questions. Where can you learn more? Go to http://mahiAerie Pharmaceuticalsmara.info/. Enter R264 in the search box to learn more about \"Advance Directives: Care Instructions. \" Current as of: November 17, 2016 Content Version: 11.3 © 7383-6102 [employer]. Care instructions adapted under license by [employer] (which disclaims liability or warranty for this information). If you have questions about a medical condition or this instruction, always ask your healthcare professional. [place] [address] any warranty or liability for your use of this information. PRESCRIPTION REFILL POLICY [facility] Statement to Patients April 1, 2014 In an effort to ensure the large volume of patient prescription refills is processed in the most efficient and expeditious manner, we are asking our patients to assist us by calling your Pharmacy for all prescription refills, this will include also your  Mail Order Pharmacy. The pharmacy will contact our office electronically to continue the refill process. Please do not wait until the last minute to call your pharmacy. We need at least 48 hours (2days) to fill prescriptions. We also encourage you to call your pharmacy before going to  your prescription to make sure it is ready. With regard to controlled substance prescription refill requests (narcotic refills) that need to be picked up at our office, we ask your cooperation by providing us with at least 72 hours (3days) notice that you will need a refill. We will not refill narcotic prescription refill requests after 4:00pm on any weekday, Monday through Thursday, or after 2:00pm on Fridays, or on the weekends. We encourage everyone to explore another way of getting your prescription refill request processed using [employer], our patient web portal through our electronic medical record system. [employer] is an efficient and effective way to communicate your medication request directly to the office and  downloadable as an gregorio on your smart phone . [employer] also features a review functionality that allows you to view your medication list as well as leave messages for your physician. Are you ready to get connected? If so please review the attatched instructions or speak to any of our staff to get you set up right away! Thank you so much for your cooperation. Should you have any questions please contact our Practice Administrator. The Physicians and Staff,  [facility] Introducing [facility]! Dear Norman Reyna: Thank you for requesting a [employer] account. Our records indicate that you already have an active [employer] account. You can access your account anytime at [email or web address] Did you know that you can access your hospital and ER discharge instructions at any time in [employer]ger? You can also review all of your test results from your hospital stay or ER visit. Additional Information If you have questions, please visit the Frequently Asked Questions section of the [employer] website at [email or web address] Remember, [employer]ger is NOT to be used for urgent needs. For medical emergencies, dial 911. Now available from your iPhone and Android! Please provide this summary of care documentation to your next provider. Your primary care clinician is listed as Colt Greene. If you have any questions after today's visit, please call [phone].

## 2017-09-06 NOTE — PATIENT INSTRUCTIONS
Learning About Living Luiz  What is a living will? A living will is a legal form you use to write down the kind of care you want at the end of your life. It is used by the health professionals who will treat you if you aren't able to decide for yourself. If you put your wishes in writing, your loved ones and others will know what kind of care you want. They won't need to guess. This can ease your mind and be helpful to others. A living will is not the same as an estate or property will. An estate will explains what you want to happen with your money and property after you die. Is a living will a legal document? A living will is a legal document. Each state has its own laws about living newman. If you move to another state, make sure that your living will is legal in the state where you now live. Or you might use a universal form that has been approved by many states. This kind of form can sometimes be completed and stored online. Your electronic copy will then be available wherever you have a connection to the Internet. In most cases, doctors will respect your wishes even if you have a form from a different state. · You don't need an  to complete a living will. But legal advice can be helpful if your state's laws are unclear, your health history is complicated, or your family can't agree on what should be in your living will. · You can change your living will at any time. Some people find that their wishes about end-of-life care change as their health changes. · In addition to making a living will, think about completing a medical power of  form. This form lets you name the person you want to make end-of-life treatment decisions for you (your \"health care agent\") if you're not able to. Many hospitals and nursing homes will give you the forms you need to complete a living will and a medical power of .   · Your living will is used only if you can't make or communicate decisions for yourself anymore. If you become able to make decisions again, you can accept or refuse any treatment, no matter what you wrote in your living will. · Your state may offer an online registry. This is a place where you can store your living will online so the doctors and nurses who need to treat you can find it right away. What should you think about when creating a living will? Talk about your end-of-life wishes with your family members and your doctor. Let them know what you want. That way the people making decisions for you won't be surprised by your choices. Think about these questions as you make your living will:  · Do you know enough about life support methods that might be used? If not, talk to your doctor so you know what might be done if you can't breathe on your own, your heart stops, or you're unable to swallow. · What things would you still want to be able to do after you receive life-support methods? Would you want to be able to walk? To speak? To eat on your own? To live without the help of machines? · If you have a choice, where do you want to be cared for? In your home? At a hospital or nursing home? · Do you want certain Restoration practices performed if you become very ill? · If you have a choice at the end of your life, where would you prefer to die? At home? In a hospital or nursing home? Somewhere else? · Would you prefer to be buried or cremated? · Do you want your organs to be donated after you die? What should you do with your living will? · Make sure that your family members and your health care agent have copies of your living will. · Give your doctor a copy of your living will to keep in your medical record. If you have more than one doctor, make sure that each one has a copy. · You may want to put a copy of your living will where it can be easily found. Where can you learn more? Go to http://mahi-mara.info/.   Enter U232 in the search box to learn more about \"Learning About Living Luiz. \"  Current as of: August 8, 2016  Content Version: 11.3  © 7279-9693 [employer]. Care instructions adapted under license by [employer] (which disclaims liability or warranty for this information). If you have questions about a medical condition or this instruction, always ask your healthcare professional. [address] any warranty or liability for your use of this information. Advance Directives: Care Instructions  Your Care Instructions  An advance directive is a legal way to state your wishes at the end of your life. It tells your family and your doctor what to do if you can no longer say what you want. There are two main types of advance directives. You can change them any time that your wishes change. · A living will tells your family and your doctor your wishes about life support and other treatment. · A durable power of  for health care lets you name a person to make treatment decisions for you when you can't speak for yourself. This person is called a health care agent. If you do not have an advance directive, decisions about your medical care may be made by a doctor or a  who doesn't know you. It may help to think of an advance directive as a gift to the people who care for you. If you have one, they won't have to make tough decisions by themselves. Follow-up care is a key part of your treatment and safety. Be sure to make and go to all appointments, and call your doctor if you are having problems. It's also a good idea to know your test results and keep a list of the medicines you take. How can you care for yourself at home? · Discuss your wishes with your loved ones and your doctor. This way, there are no surprises. · Many states have a unique form. Or you might use a universal form that has been approved by many states. This kind of form can sometimes be completed and stored online.  Your electronic copy will then be available wherever you have a connection to the Internet. In most cases, doctors will respect your wishes even if you have a form from a different state. · You don't need a  to do an advance directive. But you may want to get legal advice. · Think about these questions when you prepare an advance directive:  ¨ Who do you want to make decisions about your medical care if you are not able to? Many people choose a family member or close friend. ¨ Do you know enough about life support methods that might be used? If not, talk to your doctor so you understand. ¨ What are you most afraid of that might happen? You might be afraid of having pain, losing your independence, or being kept alive by machines. ¨ Where would you prefer to die? Choices include your home, a hospital, or a nursing home. ¨ Would you like to have information about hospice care to support you and your family? ¨ Do you want to donate organs when you die? ¨ Do you want certain Adventism practices performed before you die? If so, put your wishes in the advance directive. · Read your advance directive every year, and make changes as needed. When should you call for help? Be sure to contact your doctor if you have any questions. Where can you learn more? Go to http://mahi-mara.info/. Enter R264 in the search box to learn more about \"Advance Directives: Care Instructions. \"  Current as of: November 17, 2016  Content Version: 11.3  © 2985-5758 [employer]. Care instructions adapted under license by [employer] (which disclaims liability or warranty for this information). If you have questions about a medical condition or this instruction, always ask your healthcare professional. [place] [address] any warranty or liability for your use of this information.   [facility]   Statement to Patients  April 1, 2014      In an effort to ensure the large volume of patient prescription refills is processed in the most efficient and expeditious manner, we are asking our patients to assist us by calling your Pharmacy for all prescription refills, this will include also your  Mail Order Pharmacy. The pharmacy will contact our office electronically to continue the refill process. Please do not wait until the last minute to call your pharmacy. We need at least 48 hours (2days) to fill prescriptions. We also encourage you to call your pharmacy before going to  your prescription to make sure it is ready. With regard to controlled substance prescription refill requests (narcotic refills) that need to be picked up at our office, we ask your cooperation by providing us with at least 72 hours (3days) notice that you will need a refill. We will not refill narcotic prescription refill requests after 4:00pm on any weekday, Monday through Thursday, or after 2:00pm on Fridays, or on the weekends. We encourage everyone to explore another way of getting your prescription refill request processed using [employer], our patient web portal through our electronic medical record system. [employer] is an efficient and effective way to communicate your medication request directly to the office and  downloadable as an gregorio on your smart phone . [employer] also features a review functionality that allows you to view your medication list as well as leave messages for your physician. Are you ready to get connected? If so please review the attatched instructions or speak to any of our staff to get you set up right away! Thank you so much for your cooperation. Should you have any questions please contact our Practice Administrator.     The Physicians and Staff,  [facility]

## 2017-09-06 NOTE — LETTER
Reviewed record in preparation for visit and have necessary documentation Pt did not bring medication to office visit for review Medication list reviewed and reconciled with patient Information was given to pt on Advanced Directives, Living Will 
opportunity was given for questions Neurology Progress Note Patient ID: Milka James [id number] 
45 y.o. 
1965 HISTORY PROVIDED BY: 
Patient Chief Complaint: Numbness Subjective:  
 Ms. Myrtle Dumas is here for follow up today of numbness and pain. Patient had an EMG/NCS since her last visit of all 4 extremities. This was negative. She did have decreased recruitment due to lack of effort on the muscle part of the examination. Patient unable to get an MRI due to her pain stimulator. Patient reports her last imaging with myelogram in 2015 which she had after losing some bowel and bladder function. She has had surgery in the past. 
She did have post pain stimulator placed by Dr. Ellie Carmer several years ago. This did not help her at all. She feels like it gave her nerve damage in her legs. Last visit I did prescribe Cymbalta but the pharmacist would not let her have it saying that she needed to check with me first.  Patient is taking Effexor 150 mg twice daily, Klonopin, and Xanax. Discussed with her that we does have to do a low-dose of the Cymbalta. Also discussed serotonin syndrome and what to look for. Patient reports she still having issues where she will have complete numbness in her arms and legs and they will get numb to the point that she will fall. She does not understand how her EMG could be normal. 
Patient is also trying to establish with a new pain management doctor. She states that she is out of her oxycodone and going to run out of her morphine. She sees them on the 13th. Patient continues to report vision changes. She feels like her distance reading is impaired. Objective:  
ROS: 
Per HPI- 
Otherwise 12 point ROS was negative Meds: 
Current Outpatient Prescriptions on File Prior to Visit Medication Sig Dispense Refill  ALPRAZolam (XANAX) 1 mg tablet TK 1 T PO TID PRN  1  
 morphine CR (MS CONTIN) 30 mg CR tablet TK 1 T PO BID  0  
 traZODone (DESYREL) 100 mg tablet TK 2 TS PO QHS  1  
 venlafaxine (EFFEXOR) 75 mg tablet TK 2 T IN THE MORNING AND 1 T PO AT NOON  1  
 dextroamphetamine-amphetamine (ADDERALL) 20 mg tablet TK 1 T PO AT 8 AM AND 1 TPO  AT NOON  0  
 baclofen (LIORESAL) 10 mg tablet Take  by mouth three (3) times daily.  clonazePAM (KLONOPIN) 1 mg disintegrating tablet PLACE 1 WAFTER ON TONGUE AND LET DISSOLVE TID PRN  1  
 oxyCODONE-acetaminophen (PERCOCET 10)  mg per tablet TK 1 T PO QD PRN  0  
 tretinoin (RETIN-A) 0.025 % topical cream   0  
 DULoxetine (CYMBALTA) 30 mg capsule Take 1 Cap by mouth daily. 30 Cap 5 No current facility-administered medications on file prior to visit. Imaging: No new imaging EMG/NCS: Negative Reviewed records in finalsite and Scutum tab today Lab Review Results for orders placed or performed during the hospital encounter of 03/02/10 CULTURE, BLOOD, PAIRED Result Value Ref Range Specimen Description: BLOOD Special Requests: NO SPECIAL REQUESTS Culture result: NO GROWTH 5 DAYS Report Status [id number] FINAL   
HGB & HCT Result Value Ref Range HGB 9.9 (L) 11.5 - 16.0 g/dL HCT 29.7 (L) 35.0 - 47.0 % HGB & HCT Result Value Ref Range HGB 8.5 (L) 11.5 - 16.0 g/dL HCT 26.1 (L) 35.0 - 47.0 % HGB & HCT Result Value Ref Range HGB 9.7 (L) 11.5 - 16.0 g/dL HCT 29.5 (L) 35.0 - 47.0 % URINALYSIS W/ REFLEX CULTURE Result Value Ref Range Color YELLOW Appearance CLEAR Specific gravity 1.015 1.003 - 1.030    
 pH (UA) 6.0 5.0 - 8.0 Protein NEGATIVE  NEGATIVE MG/DL Glucose NEGATIVE  NEGATIVE MG/DL Ketone NEGATIVE  NEGATIVE MG/DL Bilirubin NEGATIVE  NEGATIVE  Blood NEGATIVE  NEGATIVE  
 Urobilinogen 0.2 0.2 - 1.0 EU/DL Nitrites NEGATIVE  NEGATIVE Leukocyte Esterase NEGATIVE  NEGATIVE  
 UA:UC IF INDICATED CULTURE NOT INDICATED BY UA RESULT   
 WBC 0-4 0 - 4 /HPF  
 RBC 0-3 0 - 5 /HPF Epithelial cells 0-5 0 - 5 /LPF Bacteria NEGATIVE  NEGATIVE /HPF Hyaline cast 0-2 0 - 2 INFLUENZA A & B AG Result Value Ref Range Influenza A Antigen NEGATIVE  NEGATIVE Influenza B Antigen NEGATIVE  NEGATIVE METABOLIC PANEL, COMPREHENSIVE Result Value Ref Range Sodium 143 136 - 145 MMOL/L Potassium 3.4 (L) 3.5 - 5.1 MMOL/L Chloride 105 97 - 108 MMOL/L  
 CO2 30 21 - 32 MMOL/L Anion gap 8 5 - 15 mmol/L Glucose 141 (H) 50 - 100 MG/DL  
 BUN 4 (L) 6 - 20 MG/DL Creatinine 0.9 0.6 - 1.3 MG/DL  
 BUN/Creatinine ratio 4 (L) 12 - 20 GFR est AA >60 >60 ml/min/1.73m2 GFR est non-AA >60 >60 ml/min/1.73m2 Calcium 8.0 (L) 8.5 - 10.1 MG/DL Bilirubin, total 0.2 0.2 - 1.0 MG/DL  
 ALT (SGPT) 32 30 - 65 U/L  
 AST (SGOT) 26 15 - 37 U/L Alk. phosphatase 56 50 - 136 U/L Protein, total 5.2 (L) 6.4 - 8.2 g/dL Albumin 2.7 (L) 3.5 - 5.0 g/dL Globulin 2.5 2.0 - 4.0 g/dL A-G Ratio 1.1 1.1 - 2.2    
CBC W/O DIFF Result Value Ref Range WBC 10.9 3.6 - 11.0 K/uL  
 RBC 2.59 (L) 3.80 - 5.20 M/uL HGB 8.3 (L) 11.5 - 16.0 g/dL HCT 25.1 (L) 35.0 - 47.0 % MCV 96.9 80.0 - 99.0 FL  
 MCH 32.0 26.0 - 34.0 PG  
 MCHC 33.1 30.0 - 36.5 g/dL  
 RDW 13.4 11.5 - 14.5 % PLATELET 869 873 - 156 K/uL CBC W/ AUTOMATED DIFF Result Value Ref Range WBC 10.6 3.6 - 11.0 K/uL  
 RBC 2.70 (L) 3.80 - 5.20 M/uL HGB 8.7 (L) 11.5 - 16.0 g/dL HCT 25.7 (L) 35.0 - 47.0 % MCV 95.2 80.0 - 99.0 FL  
 MCH 32.2 26.0 - 34.0 PG  
 MCHC 33.9 30.0 - 36.5 g/dL  
 RDW 12.7 11.5 - 14.5 % PLATELET 331 835 - 311 K/uL NEUTROPHILS 67 32 - 75 % LYMPHOCYTES 15 12 - 49 % MONOCYTES 13 5 - 13 % EOSINOPHILS 5 0 - 7 % BASOPHILS 0 0 - 1 %  
 ABS. NEUTROPHILS 7.1 1.8 - 8.0 K/UL ABS. LYMPHOCYTES 1.6 0.8 - 3.5 K/UL  
 ABS. MONOCYTES 1.4 (H) 0.0 - 1.0 K/UL  
 ABS. EOSINOPHILS 0.5 (H) 0.0 - 0.4 K/UL  
 ABS. BASOPHILS 0.0 0.0 - 0.1 K/UL  
 
EMG/NCS: 
Findings: 
 
1. EMG assessment of all 4 extremities and affiliated paraspinals failed to confidently delineate acute denervation or chronic denervation/reinnervation features or myopathic potentials. The uniform theme among motor recruitment was no realistic or sustained recruitment effort obtained at any of the muscle encounters. At best, muscle recruitment was easily less than 20%. 2.  Nerve conduction portion was normal except for the isolated finding of slight delay in the right ulnar motor latency at the wrist and diminished nerve conduction velocity in the same ulnar nerve above elbow segment with maintenance of motor amplitude above and below elbow. Impression: 1. No clearly defined neuropathic or myopathic disease uncovered with suboptimal patient effort at all muscles tested. 2.  Subtle conduction block of right ulnar motor nerve at the wrist and a possible suggestion of right ulnar nerve compression in the above elbow segment of uncertain if any significance. Exam: 
Visit Vitals  /64  Resp 20  Wt 73.7 kg (162 lb 6.4 oz) Gen: Well developed CV: RRR Lungs: non labored breathing Abd: non distending Neuro: A&O x 3, no dysarthria or aphasia CN II-XII: Right pupil 5 mm, left pupil 3 mm this is the same in light and dark, EOMI, face symmetric, tongue/palate midline Motor: strength 5/5 all four ext Sensory: intact to LT Gait: Wide-based Assessment:  
Zara Rivera is a 45 y.o. female who presents for follow up of generalized numbness. 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  Patient is also having balance issues secondary to this.   Since we are unable to do an MRI to evaluate for demyelinating disease, will do CT scans.  Will do a CT of the head, C-spine, and L-spine. The lumbar spine is to evaluate for her back pain and balance issues. We will also start patient on Cymbalta today. On neuro exam patient does have anisocoria but it is the same in light and dark. I suspect this is physiologic but will do a CT of the head to evaluate for any aneurysm. Plan: 1. CT head/C-spine/L-spine 2. Start Cymbalta 30 mg daily. Side effects discussed. Serotonin syndrome discussed. Information given to patient. 3.  Encouraged patient to have a follow-up with her eye doctor 4. Encouraged patient to keep her pain management appointment as I do not do narcotics 5. Evaluate anisocoria with CT of the head. Patient does have a history of head trauma. Follow-up 6 weeks Signed: 
Olivia Oro MD 
9/6/2017 This note was created using voice recognition software. Despite editing, there may be syntax errors. This note will not be viewable in [address]

## 2017-09-10 ENCOUNTER — ED HISTORICAL/CONVERTED ENCOUNTER (OUTPATIENT)
Dept: OTHER | Age: 52
End: 2017-09-10

## 2017-09-11 ENCOUNTER — ED HISTORICAL/CONVERTED ENCOUNTER (OUTPATIENT)
Dept: OTHER | Age: 52
End: 2017-09-11

## 2017-10-11 ENCOUNTER — TELEPHONE (OUTPATIENT)
Dept: NEUROLOGY | Age: 52
End: 2017-10-11

## 2017-10-11 DIAGNOSIS — M62.81 MUSCLE WEAKNESS: ICD-10-CM

## 2017-10-11 DIAGNOSIS — R20.2 NUMBNESS AND TINGLING: Primary | ICD-10-CM

## 2017-10-11 DIAGNOSIS — R20.0 NUMBNESS AND TINGLING: Primary | ICD-10-CM

## 2017-10-11 DIAGNOSIS — M48.02 CERVICAL STENOSIS OF SPINAL CANAL: ICD-10-CM

## 2017-10-11 DIAGNOSIS — H53.9 VISION CHANGES: ICD-10-CM

## 2017-10-11 NOTE — TELEPHONE ENCOUNTER
----- Message from Jenelle Ulloa sent at 10/9/2017  4:16 PM EDT -----  Could you please put in another order for this pt to have a ct of her head? She says she was not aware that her appointment was scheduled so she no-showed. When she no showed that one order was canceled. Thanks!

## 2017-10-13 ENCOUNTER — TELEPHONE (OUTPATIENT)
Dept: NEUROLOGY | Age: 52
End: 2017-10-13

## 2017-10-13 DIAGNOSIS — M48.02 CERVICAL SPINAL STENOSIS: ICD-10-CM

## 2017-10-13 DIAGNOSIS — R20.0 NUMBNESS: Primary | ICD-10-CM

## 2017-10-13 DIAGNOSIS — H57.02 ANISOCORIA: ICD-10-CM

## 2017-10-16 ENCOUNTER — HOSPITAL ENCOUNTER (OUTPATIENT)
Dept: CT IMAGING | Age: 52
Discharge: HOME | End: 2017-10-16
Attending: PSYCHIATRY & NEUROLOGY
Payer: MEDICARE

## 2017-10-16 DIAGNOSIS — I69.998 DISTURBANCES OF VISION, LATE EFFECT OF CEREBROVASCULAR DISEASE: ICD-10-CM

## 2017-10-16 DIAGNOSIS — M62.81 MUSCLE WEAKNESS: ICD-10-CM

## 2017-10-16 DIAGNOSIS — H53.9 DISTURBANCES OF VISION, LATE EFFECT OF CEREBROVASCULAR DISEASE: ICD-10-CM

## 2017-10-16 DIAGNOSIS — M48.02 SPINAL STENOSIS IN CERVICAL REGION: ICD-10-CM

## 2017-10-16 DIAGNOSIS — R20.2 NUMBNESS AND TINGLING: ICD-10-CM

## 2017-10-16 DIAGNOSIS — R20.0 TACTILE ANESTHESIA: ICD-10-CM

## 2017-10-16 DIAGNOSIS — M48.061 SPINAL STENOSIS, LUMBAR REGION, WITHOUT NEUROGENIC CLAUDICATION: ICD-10-CM

## 2017-10-16 DIAGNOSIS — R20.0 NUMBNESS AND TINGLING: ICD-10-CM

## 2017-10-16 DIAGNOSIS — M48.02 CERVICAL SPINAL STENOSIS: ICD-10-CM

## 2017-10-16 DIAGNOSIS — H53.9 VISION CHANGES: ICD-10-CM

## 2017-10-16 DIAGNOSIS — M48.02 CERVICAL STENOSIS OF SPINAL CANAL: ICD-10-CM

## 2017-10-16 DIAGNOSIS — R20.0 NUMBNESS: ICD-10-CM

## 2017-10-16 DIAGNOSIS — H57.02 ANISOCORIA: ICD-10-CM

## 2017-10-16 DIAGNOSIS — M62.81 MUSCLE WEAKNESS (GENERALIZED): ICD-10-CM

## 2017-10-16 PROCEDURE — 72125 CT NECK SPINE W/O DYE: CPT

## 2017-10-16 PROCEDURE — 70450 CT HEAD/BRAIN W/O DYE: CPT

## 2017-10-16 PROCEDURE — 72131 CT LUMBAR SPINE W/O DYE: CPT

## 2017-10-18 ENCOUNTER — OFFICE VISIT (OUTPATIENT)
Dept: NEUROLOGY | Age: 52
End: 2017-10-18

## 2017-10-18 VITALS — DIASTOLIC BLOOD PRESSURE: 80 MMHG | SYSTOLIC BLOOD PRESSURE: 110 MMHG | HEART RATE: 89 BPM | OXYGEN SATURATION: 97 %

## 2017-10-18 DIAGNOSIS — M47.27 LUMBOSACRAL RADICULOPATHY DUE TO DEGENERATIVE JOINT DISEASE OF SPINE: ICD-10-CM

## 2017-10-18 DIAGNOSIS — M54.12 RADICULOPATHY, CERVICAL: ICD-10-CM

## 2017-10-18 DIAGNOSIS — M48.062 LUMBAR STENOSIS WITH NEUROGENIC CLAUDICATION: Primary | ICD-10-CM

## 2017-10-18 RX ORDER — AMITRIPTYLINE HYDROCHLORIDE 25 MG/1
25 TABLET, FILM COATED ORAL
Qty: 30 TAB | Refills: 5 | Status: SHIPPED | OUTPATIENT
Start: 2017-10-18 | End: 2018-02-19

## 2017-10-18 NOTE — LETTER
Neurology Progress Note Patient ID: Lupe Marc [id number] 
45 y.o. 
1965 HISTORY PROVIDED BY: 
Patient Chief Complaint: Numbness Subjective:  
 Ms. Nasra Lamas is here for follow up today of numbness and pain. She is having continued pain and weakness. She did have imaging done with a CT head, CT C spine, and CT L spine. CT of head was essentially negative. CT of the cervical and lumbar spine showed the areas of previous fusion. In her lumbar spine she has severe stenosis above the level of fusion. Patient did have surgery in 2010 of L3 through S1. At last visit we did try patient on Cymbalta. She had vomiting and mood issues. She stopped taking it. 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 Imaging was negative. This is likely physiologic. Patient continues to have balance issues. She is following the pain management to be on morphine for her chronic pain. Her surgeon that the previous surgeries has moved. She is interested in seeing someone else. Recap: 
Patient had an EMG/NCS since her last visit of all 4 extremities. This was negative. She did have decreased recruitment due to lack of effort on the muscle part of the examination. Patient unable to get an MRI due to her pain stimulator. Patient reports her last imaging with myelogram in 2015 which she had after losing some bowel and bladder function. She has had surgery in the past. 
She did have post pain stimulator placed by Dr. Tisha Dale several years ago. This did not help her at all. She feels like it gave her nerve damage in her legs. Last visit I did prescribe Cymbalta but the pharmacist would not let her have it saying that she needed to check with me first.  Patient is taking Effexor 150 mg twice daily, Klonopin, and Xanax. Discussed with her that we does have to do a low-dose of the Cymbalta. Also discussed serotonin syndrome and what to look for. Patient reports she still having issues where she will have complete numbness in her arms and legs and they will get numb to the point that she will fall. She does not understand how her EMG could be normal. 
Patient is also trying to establish with a new pain management doctor. She states that she is out of her oxycodone and going to run out of her morphine. She sees them on the 13th. Patient continues to report vision changes. She feels like her distance reading is impaired. Objective:  
ROS: 
Per HPI- 
Otherwise 12 point ROS was negative Meds: 
Current Outpatient Prescriptions on File Prior to Visit Medication Sig Dispense Refill  naproxen sodium (ALEVE) 220 mg tablet Take 220 mg by mouth two (2) times daily (with meals).  DULoxetine (CYMBALTA) 30 mg capsule Take 1 Cap by mouth daily. 30 Cap 5  ALPRAZolam (XANAX) 1 mg tablet TK 1 T PO TID PRN  1  
 clonazePAM (KLONOPIN) 1 mg disintegrating tablet PLACE 1 WAFTER ON TONGUE AND LET DISSOLVE TID PRN  1  
 morphine CR (MS CONTIN) 30 mg CR tablet TK 1 T PO BID  0  
 oxyCODONE-acetaminophen (PERCOCET 10)  mg per tablet TK 1 T PO QD PRN  0  
 traZODone (DESYREL) 100 mg tablet TK 2 TS PO QHS  1  
 venlafaxine (EFFEXOR) 75 mg tablet TK 2 T IN THE MORNING AND 1 T PO AT NOON  1  
 tretinoin (RETIN-A) 0.025 % topical cream   0  
 dextroamphetamine-amphetamine (ADDERALL) 20 mg tablet TK 1 T PO AT 8 AM AND 1 TPO  AT NOON  0  
 baclofen (LIORESAL) 10 mg tablet Take  by mouth three (3) times daily. No current facility-administered medications on file prior to visit. Imaging: CT head: Negative (I personally reviewed these images in PACS and this is my impression) CT C-spine: Stable fusion C3 through 7 CT L-spine fusion L3 through S1 with severe stenosis at all levels but most significant at L1/2 EMG/NCS: Negative [email or web address] today Lab Review Results for orders placed or performed during the hospital encounter of 03/02/10 CULTURE, BLOOD, PAIRED Result Value Ref Range Specimen Description: BLOOD Special Requests: NO SPECIAL REQUESTS Culture result: NO GROWTH 5 DAYS Report Status [id number] FINAL   
HGB & HCT Result Value Ref Range HGB 9.9 (L) 11.5 - 16.0 g/dL HCT 29.7 (L) 35.0 - 47.0 % HGB & HCT Result Value Ref Range HGB 8.5 (L) 11.5 - 16.0 g/dL HCT 26.1 (L) 35.0 - 47.0 % HGB & HCT Result Value Ref Range HGB 9.7 (L) 11.5 - 16.0 g/dL HCT 29.5 (L) 35.0 - 47.0 % URINALYSIS W/ REFLEX CULTURE Result Value Ref Range Color YELLOW Appearance CLEAR Specific gravity 1.015 1.003 - 1.030    
 pH (UA) 6.0 5.0 - 8.0 Protein NEGATIVE  NEGATIVE MG/DL Glucose NEGATIVE  NEGATIVE MG/DL Ketone NEGATIVE  NEGATIVE MG/DL Bilirubin NEGATIVE  NEGATIVE Blood NEGATIVE  NEGATIVE Urobilinogen 0.2 0.2 - 1.0 EU/DL Nitrites NEGATIVE  NEGATIVE Leukocyte Esterase NEGATIVE  NEGATIVE  
 UA:UC IF INDICATED CULTURE NOT INDICATED BY UA RESULT   
 WBC 0-4 0 - 4 /HPF  
 RBC 0-3 0 - 5 /HPF Epithelial cells 0-5 0 - 5 /LPF Bacteria NEGATIVE  NEGATIVE /HPF Hyaline cast 0-2 0 - 2 INFLUENZA A & B AG Result Value Ref Range Influenza A Antigen NEGATIVE  NEGATIVE Influenza B Antigen NEGATIVE  NEGATIVE METABOLIC PANEL, COMPREHENSIVE Result Value Ref Range Sodium 143 136 - 145 MMOL/L Potassium 3.4 (L) 3.5 - 5.1 MMOL/L Chloride 105 97 - 108 MMOL/L  
 CO2 30 21 - 32 MMOL/L Anion gap 8 5 - 15 mmol/L Glucose 141 (H) 50 - 100 MG/DL  
 BUN 4 (L) 6 - 20 MG/DL Creatinine 0.9 0.6 - 1.3 MG/DL  
 BUN/Creatinine ratio 4 (L) 12 - 20 GFR est AA >60 >60 ml/min/1.73m2 GFR est non-AA >60 >60 ml/min/1.73m2 Calcium 8.0 (L) 8.5 - 10.1 MG/DL Bilirubin, total 0.2 0.2 - 1.0 MG/DL  
 ALT (SGPT) 32 30 - 65 U/L  
 AST (SGOT) 26 15 - 37 U/L Alk.  phosphatase 56 50 - 136 U/L  
 Protein, total 5.2 (L) 6.4 - 8.2 g/dL Albumin 2.7 (L) 3.5 - 5.0 g/dL Globulin 2.5 2.0 - 4.0 g/dL A-G Ratio 1.1 1.1 - 2.2    
CBC W/O DIFF Result Value Ref Range WBC 10.9 3.6 - 11.0 K/uL  
 RBC 2.59 (L) 3.80 - 5.20 M/uL HGB 8.3 (L) 11.5 - 16.0 g/dL HCT 25.1 (L) 35.0 - 47.0 % MCV 96.9 80.0 - 99.0 FL  
 MCH 32.0 26.0 - 34.0 PG  
 MCHC 33.1 30.0 - 36.5 g/dL  
 RDW 13.4 11.5 - 14.5 % PLATELET 780 058 - 095 K/uL CBC W/ AUTOMATED DIFF Result Value Ref Range WBC 10.6 3.6 - 11.0 K/uL  
 RBC 2.70 (L) 3.80 - 5.20 M/uL HGB 8.7 (L) 11.5 - 16.0 g/dL HCT 25.7 (L) 35.0 - 47.0 % MCV 95.2 80.0 - 99.0 FL  
 MCH 32.2 26.0 - 34.0 PG  
 MCHC 33.9 30.0 - 36.5 g/dL  
 RDW 12.7 11.5 - 14.5 % PLATELET 564 349 - 743 K/uL NEUTROPHILS 67 32 - 75 % LYMPHOCYTES 15 12 - 49 % MONOCYTES 13 5 - 13 % EOSINOPHILS 5 0 - 7 % BASOPHILS 0 0 - 1 %  
 ABS. NEUTROPHILS 7.1 1.8 - 8.0 K/UL  
 ABS. LYMPHOCYTES 1.6 0.8 - 3.5 K/UL  
 ABS. MONOCYTES 1.4 (H) 0.0 - 1.0 K/UL  
 ABS. EOSINOPHILS 0.5 (H) 0.0 - 0.4 K/UL  
 ABS. BASOPHILS 0.0 0.0 - 0.1 K/UL  
 
EMG/NCS: 
Findings: 
 
1. EMG assessment of all 4 extremities and affiliated paraspinals failed to confidently delineate acute denervation or chronic denervation/reinnervation features or myopathic potentials. The uniform theme among motor recruitment was no realistic or sustained recruitment effort obtained at any of the muscle encounters. At best, muscle recruitment was easily less than 20%. 2.  Nerve conduction portion was normal except for the isolated finding of slight delay in the right ulnar motor latency at the wrist and diminished nerve conduction velocity in the same ulnar nerve above elbow segment with maintenance of motor amplitude above and below elbow. Impression: 1. No clearly defined neuropathic or myopathic disease uncovered with suboptimal patient effort at all muscles tested. 2.  Subtle conduction block of right ulnar motor nerve at the wrist and a possible suggestion of right ulnar nerve compression in the above elbow segment of uncertain if any significance. Exam: 
Visit Vitals  /80  Pulse 89  SpO2 97% Gen: Well developed CV: RRR Lungs: non labored breathing Abd: non distending Neuro: A&O x 3, no dysarthria or aphasia CN II-XII: Right pupil 5 mm, left pupil 3 mm this is the same in light and dark, EOMI, face symmetric, tongue/palate midline Motor: strength 5/5 all four ext Sensory: intact to LT Gait: Wide-based Assessment:  
Joseph An is a 45 y.o. female who presents for follow up of generalized numbness. 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  Patient is also having balance issues secondary to this. Updated imaging shows a relatively normal CT of the head. Her spinal imaging shows significant areas of stenosis especially in the lumbar spine which is likely the etiology of her balance issues. Plan: 1. CT head/C-spine/L-spine as above 2. Start amitriptyline 25 mg daily. Side effects discussed. Serotonin syndrome discussed. Information given to patient. 3.  Referral to neurosurgery 4. Encouraged patient to keep her pain management appointment as I do not do narcotics 5. Evaluate anisocoria with CT of the head. Patient does have a history of head trauma. Follow-up 3 months Signed: 
Violeta Peres MD 
10/18/2017 This note was created using voice recognition software. Despite editing, there may be syntax errors. This note will not be viewable in [address]

## 2017-10-18 NOTE — MR AVS SNAPSHOT
Visit Information Date & Time Provider Department Dept. Phone Encounter #  
 10/18/2017 11:00 AM Aramis Aguilar MD [facility] [phone] [id number] Your Appointments 1/15/2018  9:40 AM  
Follow Up with Aramis Aguilar MD  
[facility] [address]) Appt Note: Headache  
 [address] [place] [address] [address]  
[address] [address] Upcoming Health Maintenance Date Due Hepatitis C Screening 1965 DTaP/Tdap/Td series (1 - Tdap) 8/3/1984 PAP AKA CERVICAL CYTOLOGY 8/3/1984 BREAST CANCER SCRN MAMMOGRAM 8/3/2013 FOBT Q 1 YEAR AGE 50-75 8/3/2013 INFLUENZA AGE 9 TO ADULT 8/1/2017 Allergies as of 10/18/2017  Review Complete On: 10/18/2017 By: Aramis Aguilar MD  
  
 Severity Noted Reaction Type Reactions Cymbalta [Duloxetine]  10/18/2017    Nausea and Vomiting Other Medication  10/18/2017    Rash Steriods Current Immunizations  Never Reviewed No immunizations on file. Not reviewed this visit Vitals BP Pulse SpO2  
  
  
  
 110/80 89 97% Vitals History Preferred Pharmacy Pharmacy Name Phone [facility], [facility] [address] ([address] [phone] Your Updated Medication List  
  
   
This list is accurate as of: 10/18/17 11:33 AM.  Always use your most recent med list.  
  
  
  
  
 ALEVE 220 mg tablet Generic drug:  naproxen sodium Take 220 mg by mouth two (2) times daily (with meals). ALPRAZolam 1 mg tablet Commonly known as:  [facility] TK 1 T PO TID PRN  
  
 amitriptyline 25 mg tablet Commonly known as:  ELAVIL Take 1 Tab by mouth nightly. baclofen 10 mg tablet Commonly known as:  LIORESAL Take  by mouth three (3) times daily. clonazePAM 1 mg disintegrating tablet Commonly known as:  KlonoPIN  
PLACE 1 WAFTER ON TONGUE AND LET DISSOLVE TID PRN  
  
 dextroamphetamine-amphetamine 20 mg tablet Commonly known as:  ADDERALL TK 1 T PO AT 8 AM AND 1 TPO  AT NOON  
  
 DULoxetine 30 mg capsule Commonly known as:  CYMBALTA Take 1 Cap by mouth daily. morphine CR 30 mg CR tablet Commonly known as:  MS CONTIN  
TK 1 T PO BID  
  
 oxyCODONE-acetaminophen  mg per tablet Commonly known as:  PERCOCET 10 TK 1 T PO QD PRN  
  
 traZODone 100 mg tablet Commonly known as:  DESYREL  
TK 2 TS PO QHS  
  
 tretinoin 0.025 % topical cream  
Commonly known as:  RETIN-A  
  
 venlafaxine 75 mg tablet Commonly known as:  [facility] TK 2 T IN THE MORNING AND 1 T PO AT NOON Prescriptions Sent to Pharmacy Refills  
 amitriptyline (ELAVIL) 25 mg tablet 5 Sig: Take 1 Tab by mouth nightly. Class: Normal  
 Pharmacy: [address] #: [phone] Route: Oral  
  
Patient Instructions Patient Instructions/Plans: 
Amitriptyline (By mouth) Amitriptyline (am-i-TRIP-ti-vic) Treats depression. This medicine is a TCA. Brand Name(s): Elavil There may be other brand names for this medicine. When This Medicine Should Not Be Used: This medicine is not right for everyone. Do not use if you had an allergic reaction to amitriptyline. How to Use This Medicine:  
Tablet · Take your medicine as directed. Your dose may need to be changed several times to find what works best for you. · This medicine should come with a Medication Guide. Ask your pharmacist for a copy if you do not have one. · Store the medicine in a closed container at room temperature, away from heat, moisture, and direct light. · Missed dose: Take a dose as soon as you remember. If it is almost time for your next dose, wait until then and take a regular dose.  Do not take extra medicine to make up for a missed dose. Drugs and Foods to Avoid: Ask your doctor or pharmacist before using any other medicine, including over-the-counter medicines, vitamins, and herbal products. · Do not use this medicine with cisapride. Do not use this medicine and an MAO inhibitor (MAOI) within 14 days of each other. · Some medicines and foods can affect how amitriptyline works. Tell your doctor if you are using cimetidine, disulfiram, or guanethidine. Tell your doctor if you are using other medicine for depression (such as fluoxetine, paroxetine, sertraline), a phenothiazine medicine (such as promethazine, chlorpromazine), medicine for heart rhythm problems (such as flecainide, propafenone, quinidine), or thyroid medicine. · Tell your doctor if you use anything else that makes you sleepy. Some examples are allergy medicine, narcotic pain medicine, and alcohol. Warnings While Using This Medicine: · Tell your doctor if you are pregnant or breastfeeding, or if you have liver disease, heart disease, diabetes, narrow-angle glaucoma, a seizure disorder, a thyroid problem, or trouble urinating. · For some children, teenagers, and young adults, this medicine may increase mental or emotional problems. This may lead to thoughts of suicide and violence. Talk with your doctor right away if you have any thoughts or behavior changes that concern you. Tell your doctor if you or anyone in your family has a history of bipolar disorder or suicide attempts. · This medicine may cause the following problems:  
¨ Heart rhythm problems ¨ High or low blood sugar levels · This medicine may make you dizzy or drowsy. Do not drive or do anything else that could be dangerous until you know how this medicine affects you. · Do not stop using this medicine suddenly. Your doctor will need to slowly decrease your dose before you stop it completely. · Keep all medicine out of the reach of children.  Never share your medicine with anyone. Possible Side Effects While Using This Medicine:  
Call your doctor right away if you notice any of these side effects: · Allergic reaction: Itching or hives, swelling in your face or hands, swelling or tingling in your mouth or throat, chest tightness, trouble breathing · Anxiety, restlessness, seeing or hearing things that are not there · Chest pain, trouble breathing · Fast, pounding, or uneven heartbeat · Feeling more excited or energetic than usual, racing thoughts, trouble sleeping · Lightheadedness, dizziness, or fainting · Seizures · Thoughts of hurting yourself or others, unusual behavior · Unusual bleeding or bruising If you notice these less serious side effects, talk with your doctor: · Blurred vision, dry mouth, fever · Change in how much or how often you urinate · Constipation, diarrhea, nausea, vomiting · Drowsiness, sleepiness · Sexual problems If you notice other side effects that you think are caused by this medicine, tell your doctor. Call your doctor for medical advice about side effects. You may report side effects to FDA at 3-463-FDA-8795 © 2017 [address] Information is for End User's use only and may not be sold, redistributed or otherwise used for commercial purposes. The above information is an  only. It is not intended as medical advice for individual conditions or treatments. Talk to your doctor, nurse or pharmacist before following any medical regimen to see if it is safe and effective for you. Introducing [facility] & HEALTH [facility]! Dear Ileana Rider: Thank you for requesting a [employer] account. Our records indicate that you already have an active [employer] account. You can access your account anytime at [email or web address] Did you know that you can access your hospital and ER discharge instructions at any time in [employer]?   You can also review all of your test results from your hospital stay or ER visit. Additional Information If you have questions, please visit the Frequently Asked Questions section of the [employer] website at [email or web address] Remember, [employer] is NOT to be used for urgent needs. For medical emergencies, dial 911. Now available from your iPhone and Android! Please provide this summary of care documentation to your next provider. Your primary care clinician is listed as Shan Solo. If you have any questions after today's visit, please call [phone].

## 2017-10-18 NOTE — PATIENT INSTRUCTIONS
Patient Instructions/Plans:  Amitriptyline (By mouth)   Amitriptyline (am-i-TRIP-ti-vic)  Treats depression. This medicine is a TCA. Brand Name(s): Elavil   There may be other brand names for this medicine. When This Medicine Should Not Be Used: This medicine is not right for everyone. Do not use if you had an allergic reaction to amitriptyline. How to Use This Medicine:   Tablet  · Take your medicine as directed. Your dose may need to be changed several times to find what works best for you. · This medicine should come with a Medication Guide. Ask your pharmacist for a copy if you do not have one. · Store the medicine in a closed container at room temperature, away from heat, moisture, and direct light. · Missed dose: Take a dose as soon as you remember. If it is almost time for your next dose, wait until then and take a regular dose. Do not take extra medicine to make up for a missed dose. Drugs and Foods to Avoid:   Ask your doctor or pharmacist before using any other medicine, including over-the-counter medicines, vitamins, and herbal products. · Do not use this medicine with cisapride. Do not use this medicine and an MAO inhibitor (MAOI) within 14 days of each other. · Some medicines and foods can affect how amitriptyline works. Tell your doctor if you are using cimetidine, disulfiram, or guanethidine. Tell your doctor if you are using other medicine for depression (such as fluoxetine, paroxetine, sertraline), a phenothiazine medicine (such as promethazine, chlorpromazine), medicine for heart rhythm problems (such as flecainide, propafenone, quinidine), or thyroid medicine. · Tell your doctor if you use anything else that makes you sleepy. Some examples are allergy medicine, narcotic pain medicine, and alcohol.   Warnings While Using This Medicine:   · Tell your doctor if you are pregnant or breastfeeding, or if you have liver disease, heart disease, diabetes, narrow-angle glaucoma, a seizure disorder, a thyroid problem, or trouble urinating. · For some children, teenagers, and young adults, this medicine may increase mental or emotional problems. This may lead to thoughts of suicide and violence. Talk with your doctor right away if you have any thoughts or behavior changes that concern you. Tell your doctor if you or anyone in your family has a history of bipolar disorder or suicide attempts. · This medicine may cause the following problems:   ¨ Heart rhythm problems  ¨ High or low blood sugar levels  · This medicine may make you dizzy or drowsy. Do not drive or do anything else that could be dangerous until you know how this medicine affects you. · Do not stop using this medicine suddenly. Your doctor will need to slowly decrease your dose before you stop it completely. · Keep all medicine out of the reach of children. Never share your medicine with anyone. Possible Side Effects While Using This Medicine:   Call your doctor right away if you notice any of these side effects:  · Allergic reaction: Itching or hives, swelling in your face or hands, swelling or tingling in your mouth or throat, chest tightness, trouble breathing  · Anxiety, restlessness, seeing or hearing things that are not there  · Chest pain, trouble breathing  · Fast, pounding, or uneven heartbeat  · Feeling more excited or energetic than usual, racing thoughts, trouble sleeping  · Lightheadedness, dizziness, or fainting  · Seizures  · Thoughts of hurting yourself or others, unusual behavior  · Unusual bleeding or bruising  If you notice these less serious side effects, talk with your doctor:   · Blurred vision, dry mouth, fever  · Change in how much or how often you urinate  · Constipation, diarrhea, nausea, vomiting  · Drowsiness, sleepiness  · Sexual problems  If you notice other side effects that you think are caused by this medicine, tell your doctor. Call your doctor for medical advice about side effects.  You may report side effects to FDA at 5-454-FDA-8458  © 2017 [address] Information is for End User's use only and may not be sold, redistributed or otherwise used for commercial purposes. The above information is an  only. It is not intended as medical advice for individual conditions or treatments. Talk to your doctor, nurse or pharmacist before following any medical regimen to see if it is safe and effective for you.

## 2017-10-18 NOTE — PROGRESS NOTES
Neurology Progress Note    Patient ID:  Claudia Frazier  [id number]  45 y.o.  1965    HISTORY PROVIDED BY:  Patient      Chief Complaint: Numbness  Subjective:    Ms. Valeri Osgood is here for follow up today of numbness and pain. She is having continued pain and weakness. She did have imaging done with a CT head, CT C spine, and CT L spine. CT of head was essentially negative. CT of the cervical and lumbar spine showed the areas of previous fusion. In her lumbar spine she has severe stenosis above the level of fusion. Patient did have surgery in 2010 of L3 through S1. At last visit we did try patient on Cymbalta. She had vomiting and mood issues. She stopped taking it. 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 Imaging was negative. This is likely physiologic. Patient continues to have balance issues. She is following the pain management to be on morphine for her chronic pain. Her surgeon that the previous surgeries has moved. She is interested in seeing someone else. Recap:  Patient had an EMG/NCS since her last visit of all 4 extremities. This was negative. She did have decreased recruitment due to lack of effort on the muscle part of the examination. Patient unable to get an MRI due to her pain stimulator. Patient reports her last imaging with myelogram in 2015 which she had after losing some bowel and bladder function. She has had surgery in the past.  She did have post pain stimulator placed by Dr. Magda Leigh several years ago. This did not help her at all. She feels like it gave her nerve damage in her legs. Last visit I did prescribe Cymbalta but the pharmacist would not let her have it saying that she needed to check with me first.  Patient is taking Effexor 150 mg twice daily, Klonopin, and Xanax. Discussed with her that we does have to do a low-dose of the Cymbalta. Also discussed serotonin syndrome and what to look for.   Patient reports she still having issues where she will have complete numbness in her arms and legs and they will get numb to the point that she will fall. She does not understand how her EMG could be normal.  Patient is also trying to establish with a new pain management doctor. She states that she is out of her oxycodone and going to run out of her morphine. She sees them on the 13th. Patient continues to report vision changes. She feels like her distance reading is impaired. Objective:   ROS:  Per HPI-  Otherwise 12 point ROS was negative    Meds:  Current Outpatient Prescriptions on File Prior to Visit   Medication Sig Dispense Refill    naproxen sodium (ALEVE) 220 mg tablet Take 220 mg by mouth two (2) times daily (with meals).  DULoxetine (CYMBALTA) 30 mg capsule Take 1 Cap by mouth daily. 30 Cap 5    ALPRAZolam (XANAX) 1 mg tablet TK 1 T PO TID PRN  1    clonazePAM (KLONOPIN) 1 mg disintegrating tablet PLACE 1 WAFTER ON TONGUE AND LET DISSOLVE TID PRN  1    morphine CR (MS CONTIN) 30 mg CR tablet TK 1 T PO BID  0    oxyCODONE-acetaminophen (PERCOCET 10)  mg per tablet TK 1 T PO QD PRN  0    traZODone (DESYREL) 100 mg tablet TK 2 TS PO QHS  1    venlafaxine (EFFEXOR) 75 mg tablet TK 2 T IN THE MORNING AND 1 T PO AT NOON  1    tretinoin (RETIN-A) 0.025 % topical cream   0    dextroamphetamine-amphetamine (ADDERALL) 20 mg tablet TK 1 T PO AT 8 AM AND 1 TPO  AT NOON  0    baclofen (LIORESAL) 10 mg tablet Take  by mouth three (3) times daily. No current facility-administered medications on file prior to visit.         Imaging:  CT head: Negative (I personally reviewed these images in PACS and this is my impression)  CT C-spine: Stable fusion C3 through 7  CT L-spine fusion L3 through S1 with severe stenosis at all levels but most significant at L1/2  EMG/NCS: Negative  Reviewed records in [facility] and media tab today    Lab Review   Results for orders placed or performed during the hospital encounter of 03/02/10   CULTURE, BLOOD, PAIRED   Result Value Ref Range    Specimen Description: BLOOD     Special Requests: NO SPECIAL REQUESTS     Culture result: NO GROWTH 5 DAYS     Report Status [id number] FINAL    HGB & HCT   Result Value Ref Range    HGB 9.9 (L) 11.5 - 16.0 g/dL    HCT 29.7 (L) 35.0 - 47.0 %   HGB & HCT   Result Value Ref Range    HGB 8.5 (L) 11.5 - 16.0 g/dL    HCT 26.1 (L) 35.0 - 47.0 %   HGB & HCT   Result Value Ref Range    HGB 9.7 (L) 11.5 - 16.0 g/dL    HCT 29.5 (L) 35.0 - 47.0 %   URINALYSIS W/ REFLEX CULTURE   Result Value Ref Range    Color YELLOW     Appearance CLEAR     Specific gravity 1.015 1.003 - 1.030      pH (UA) 6.0 5.0 - 8.0      Protein NEGATIVE  NEGATIVE MG/DL    Glucose NEGATIVE  NEGATIVE MG/DL    Ketone NEGATIVE  NEGATIVE MG/DL    Bilirubin NEGATIVE  NEGATIVE    Blood NEGATIVE  NEGATIVE    Urobilinogen 0.2 0.2 - 1.0 EU/DL    Nitrites NEGATIVE  NEGATIVE    Leukocyte Esterase NEGATIVE  NEGATIVE    UA:UC IF INDICATED CULTURE NOT INDICATED BY UA RESULT     WBC 0-4 0 - 4 /HPF    RBC 0-3 0 - 5 /HPF    Epithelial cells 0-5 0 - 5 /LPF    Bacteria NEGATIVE  NEGATIVE /HPF    Hyaline cast 0-2 0 - 2   INFLUENZA A & B AG   Result Value Ref Range    Influenza A Antigen NEGATIVE  NEGATIVE    Influenza B Antigen NEGATIVE  NEGATIVE   METABOLIC PANEL, COMPREHENSIVE   Result Value Ref Range    Sodium 143 136 - 145 MMOL/L    Potassium 3.4 (L) 3.5 - 5.1 MMOL/L    Chloride 105 97 - 108 MMOL/L    CO2 30 21 - 32 MMOL/L    Anion gap 8 5 - 15 mmol/L    Glucose 141 (H) 50 - 100 MG/DL    BUN 4 (L) 6 - 20 MG/DL    Creatinine 0.9 0.6 - 1.3 MG/DL    BUN/Creatinine ratio 4 (L) 12 - 20      GFR est AA >60 >60 ml/min/1.73m2    GFR est non-AA >60 >60 ml/min/1.73m2    Calcium 8.0 (L) 8.5 - 10.1 MG/DL    Bilirubin, total 0.2 0.2 - 1.0 MG/DL    ALT (SGPT) 32 30 - 65 U/L    AST (SGOT) 26 15 - 37 U/L    Alk.  phosphatase 56 50 - 136 U/L    Protein, total 5.2 (L) 6.4 - 8.2 g/dL    Albumin 2.7 (L) 3.5 - 5.0 g/dL    Globulin 2.5 2.0 - 4.0 g/dL A-G Ratio 1.1 1.1 - 2.2     CBC W/O DIFF   Result Value Ref Range    WBC 10.9 3.6 - 11.0 K/uL    RBC 2.59 (L) 3.80 - 5.20 M/uL    HGB 8.3 (L) 11.5 - 16.0 g/dL    HCT 25.1 (L) 35.0 - 47.0 %    MCV 96.9 80.0 - 99.0 FL    MCH 32.0 26.0 - 34.0 PG    MCHC 33.1 30.0 - 36.5 g/dL    RDW 13.4 11.5 - 14.5 %    PLATELET 559 204 - 450 K/uL   CBC W/ AUTOMATED DIFF   Result Value Ref Range    WBC 10.6 3.6 - 11.0 K/uL    RBC 2.70 (L) 3.80 - 5.20 M/uL    HGB 8.7 (L) 11.5 - 16.0 g/dL    HCT 25.7 (L) 35.0 - 47.0 %    MCV 95.2 80.0 - 99.0 FL    MCH 32.2 26.0 - 34.0 PG    MCHC 33.9 30.0 - 36.5 g/dL    RDW 12.7 11.5 - 14.5 %    PLATELET 599 876 - 669 K/uL    NEUTROPHILS 67 32 - 75 %    LYMPHOCYTES 15 12 - 49 %    MONOCYTES 13 5 - 13 %    EOSINOPHILS 5 0 - 7 %    BASOPHILS 0 0 - 1 %    ABS. NEUTROPHILS 7.1 1.8 - 8.0 K/UL    ABS. LYMPHOCYTES 1.6 0.8 - 3.5 K/UL    ABS. MONOCYTES 1.4 (H) 0.0 - 1.0 K/UL    ABS. EOSINOPHILS 0.5 (H) 0.0 - 0.4 K/UL    ABS. BASOPHILS 0.0 0.0 - 0.1 K/UL     EMG/NCS:  Findings:    1. EMG assessment of all 4 extremities and affiliated paraspinals failed to confidently delineate acute denervation or chronic denervation/reinnervation features or myopathic potentials. The uniform theme among motor recruitment was no realistic or sustained recruitment effort obtained at any of the muscle encounters. At best, muscle recruitment was easily less than 20%. 2.  Nerve conduction portion was normal except for the isolated finding of slight delay in the right ulnar motor latency at the wrist and diminished nerve conduction velocity in the same ulnar nerve above elbow segment with maintenance of motor amplitude above and below elbow. Impression:    1. No clearly defined neuropathic or myopathic disease uncovered with suboptimal patient effort at all muscles tested.     2.  Subtle conduction block of right ulnar motor nerve at the wrist and a possible suggestion of right ulnar nerve compression in the above elbow segment of uncertain if any significance. Exam:  Visit Vitals    /80    Pulse 89    SpO2 97%     Gen: Well developed  CV: RRR  Lungs: non labored breathing  Abd: non distending  Neuro: A&O x 3, no dysarthria or aphasia  CN II-XII: Right pupil 5 mm, left pupil 3 mm this is the same in light and dark, EOMI, face symmetric, tongue/palate midline  Motor: strength 5/5 all four ext  Sensory: intact to LT  Gait: Wide-based    Assessment:   Lizzette Puri is a 45 y.o. female who presents for follow up of generalized numbness. 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  Patient is also having balance issues secondary to this. Updated imaging shows a relatively normal CT of the head. Her spinal imaging shows significant areas of stenosis especially in the lumbar spine which is likely the etiology of her balance issues. Plan:   1. CT head/C-spine/L-spine as above  2. Start amitriptyline 25 mg daily. Side effects discussed. Serotonin syndrome discussed. Information given to patient. 3.  Referral to neurosurgery  4. Encouraged patient to keep her pain management appointment as I do not do narcotics  5. Evaluate anisocoria with CT of the head. Patient does have a history of head trauma. Follow-up 3 months    Signed:  Irineo Ormond, MD  10/18/2017    This note was created using voice recognition software. Despite editing, there may be syntax errors. This note will not be viewable in [address]

## 2018-02-19 ENCOUNTER — OFFICE VISIT (OUTPATIENT)
Dept: NEUROLOGY | Age: 53
End: 2018-02-19

## 2018-02-19 VITALS
WEIGHT: 162 LBS | SYSTOLIC BLOOD PRESSURE: 122 MMHG | HEIGHT: 62 IN | BODY MASS INDEX: 29.81 KG/M2 | RESPIRATION RATE: 20 BRPM | DIASTOLIC BLOOD PRESSURE: 74 MMHG

## 2018-02-19 DIAGNOSIS — M48.062 LUMBAR STENOSIS WITH NEUROGENIC CLAUDICATION: Primary | ICD-10-CM

## 2018-02-19 DIAGNOSIS — M48.02 CERVICAL STENOSIS OF SPINAL CANAL: ICD-10-CM

## 2018-02-19 DIAGNOSIS — M79.671 PAIN IN BOTH FEET: ICD-10-CM

## 2018-02-19 DIAGNOSIS — M79.672 PAIN IN BOTH FEET: ICD-10-CM

## 2018-02-19 DIAGNOSIS — R20.0 NUMBNESS: ICD-10-CM

## 2018-02-19 DIAGNOSIS — G56.03 BILATERAL CARPAL TUNNEL SYNDROME: ICD-10-CM

## 2018-02-19 RX ORDER — OXYCODONE HYDROCHLORIDE 5 MG/1
TABLET ORAL
Refills: 0 | COMMUNITY
Start: 2018-01-13

## 2018-02-19 NOTE — PATIENT INSTRUCTIONS
Learning About Living Luiz  What is a living will? A living will is a legal form you use to write down the kind of care you want at the end of your life. It is used by the health professionals who will treat you if you aren't able to decide for yourself. If you put your wishes in writing, your loved ones and others will know what kind of care you want. They won't need to guess. This can ease your mind and be helpful to others. A living will is not the same as an estate or property will. An estate will explains what you want to happen with your money and property after you die. Is a living will a legal document? A living will is a legal document. Each state has its own laws about living [place]. If you move to another state, make sure that your living will is legal in the state where you now live. Or you might use a universal form that has been approved by many states. This kind of form can sometimes be completed and stored online. Your electronic copy will then be available wherever you have a connection to the Internet. In most cases, doctors will respect your wishes even if you have a form from a different state. · You don't need an  to complete a living will. But legal advice can be helpful if your state's laws are unclear, your health history is complicated, or your family can't agree on what should be in your living will. · You can change your living will at any time. Some people find that their wishes about end-of-life care change as their health changes. · In addition to making a living will, think about completing a medical power of  form. This form lets you name the person you want to make end-of-life treatment decisions for you (your \"health care agent\") if you're not able to. Many hospitals and nursing homes will give you the forms you need to complete a living will and a medical power of .   · Your living will is used only if you can't make or communicate decisions for yourself anymore. If you become able to make decisions again, you can accept or refuse any treatment, no matter what you wrote in your living will. · Your state may offer an online registry. This is a place where you can store your living will online so the doctors and nurses who need to treat you can find it right away. What should you think about when creating a living will? Talk about your end-of-life wishes with your family members and your doctor. Let them know what you want. That way the people making decisions for you won't be surprised by your choices. Think about these questions as you make your living will:  · Do you know enough about life support methods that might be used? If not, talk to your doctor so you know what might be done if you can't breathe on your own, your heart stops, or you're unable to swallow. · What things would you still want to be able to do after you receive life-support methods? Would you want to be able to walk? To speak? To eat on your own? To live without the help of machines? · If you have a choice, where do you want to be cared for? In your home? At a hospital or nursing home? · Do you want certain Tenriism practices performed if you become very ill? · If you have a choice at the end of your life, where would you prefer to die? At home? In a hospital or nursing home? Somewhere else? · Would you prefer to be buried or cremated? · Do you want your organs to be donated after you die? What should you do with your living will? · Make sure that your family members and your health care agent have copies of your living will. · Give your doctor a copy of your living will to keep in your medical record. If you have more than one doctor, make sure that each one has a copy. · You may want to put a copy of your living will where it can be easily found. Where can you learn more? Go to http://mahi-mara.info/.   Enter Y667 in the search box to learn more about \"Learning About Living Luiz. \"  Current as of: September 24, 2016  Content Version: 11.4  © 6568-6240 [employer]. Care instructions adapted under license by [employer] (which disclaims liability or warranty for this information). If you have questions about a medical condition or this instruction, always ask your healthcare professional. [address] any warranty or liability for your use of this information. Advance Directives: Care Instructions  Your Care Instructions  An advance directive is a legal way to state your wishes at the end of your life. It tells your family and your doctor what to do if you can no longer say what you want. There are two main types of advance directives. You can change them any time that your wishes change. · A living will tells your family and your doctor your wishes about life support and other treatment. · A durable power of  for health care lets you name a person to make treatment decisions for you when you can't speak for yourself. This person is called a health care agent. If you do not have an advance directive, decisions about your medical care may be made by a doctor or a  who doesn't know you. It may help to think of an advance directive as a gift to the people who care for you. If you have one, they won't have to make tough decisions by themselves. Follow-up care is a key part of your treatment and safety. Be sure to make and go to all appointments, and call your doctor if you are having problems. It's also a good idea to know your test results and keep a list of the medicines you take. How can you care for yourself at home? · Discuss your wishes with your loved ones and your doctor. This way, there are no surprises. · Many states have a unique form. Or you might use a universal form that has been approved by many states. This kind of form can sometimes be completed and stored online.  Your electronic copy will then be available wherever you have a connection to the Internet. In most cases, doctors will respect your wishes even if you have a form from a different state. · You don't need a  to do an advance directive. But you may want to get legal advice. · Think about these questions when you prepare an advance directive:  ¨ Who do you want to make decisions about your medical care if you are not able to? Many people choose a family member or close friend. ¨ Do you know enough about life support methods that might be used? If not, talk to your doctor so you understand. ¨ What are you most afraid of that might happen? You might be afraid of having pain, losing your independence, or being kept alive by machines. ¨ Where would you prefer to die? Choices include your home, a hospital, or a nursing home. ¨ Would you like to have information about hospice care to support you and your family? ¨ Do you want to donate organs when you die? ¨ Do you want certain Sikhism practices performed before you die? If so, put your wishes in the advance directive. · Read your advance directive every year, and make changes as needed. When should you call for help? Be sure to contact your doctor if you have any questions. Where can you learn more? Go to http://mahi-mara.info/. Enter R264 in the search box to learn more about \"Advance Directives: Care Instructions. \"  Current as of: September 24, 2016  Content Version: 11.4  © 8011-4398 [employer]. Care instructions adapted under license by [employer] (which disclaims liability or warranty for this information). If you have questions about a medical condition or this instruction, always ask your healthcare professional. [place] [address] any warranty or liability for your use of this information.   [facility]   Statement to Patients  April 1, 2014      In an effort to ensure the large volume of patient prescription refills is processed in the most efficient and expeditious manner, we are asking our patients to assist us by calling your Pharmacy for all prescription refills, this will include also your  Mail Order Pharmacy. The pharmacy will contact our office electronically to continue the refill process. Please do not wait until the last minute to call your pharmacy. We need at least 48 hours (2days) to fill prescriptions. We also encourage you to call your pharmacy before going to  your prescription to make sure it is ready. With regard to controlled substance prescription refill requests (narcotic refills) that need to be picked up at our office, we ask your cooperation by providing us with at least 72 hours (3days) notice that you will need a refill. We will not refill narcotic prescription refill requests after 4:00pm on any weekday, Monday through Thursday, or after 2:00pm on Fridays, or on the weekends. We encourage everyone to explore another way of getting your prescription refill request processed using mobintent, our patient web portal through our electronic medical record system. mobintent is an efficient and effective way to communicate your medication request directly to the office and  downloadable as an gregorio on your smart phone . mobintent also features a review functionality that allows you to view your medication list as well as leave messages for your physician. Are you ready to get connected? If so please review the attatched instructions or speak to any of our staff to get you set up right away! Thank you so much for your cooperation. Should you have any questions please contact our Practice Administrator.     The Physicians and Staff,  [facility]

## 2018-02-19 NOTE — MR AVS SNAPSHOT
[address] [address] [address] [address] 
[phone] Patient: Alexus Angulo MRN: [id number] VSE:7/2/2640 Visit Information Date & Time Provider Department Dept. Phone Encounter #  
 2/19/2018 10:40 AM Maggie Crump MD [facility] [phone] [id number] Upcoming Health Maintenance Date Due Hepatitis C Screening 1965 DTaP/Tdap/Td series (1 - Tdap) 8/3/1984 PAP AKA CERVICAL CYTOLOGY 8/3/1984 BREAST CANCER SCRN MAMMOGRAM 8/3/2013 FOBT Q 1 YEAR AGE 50-75 8/3/2013 Influenza Age 5 to Adult 8/1/2017 Allergies as of 2/19/2018  Review Complete On: 2/19/2018 By: Maggie Crump MD  
  
 Severity Noted Reaction Type Reactions Cymbalta [Duloxetine]  10/18/2017    Nausea and Vomiting Other Medication  10/18/2017    Rash Steriods Current Immunizations  Never Reviewed No immunizations on file. Not reviewed this visit You Were Diagnosed With   
  
 Codes Comments Cervical stenosis of spinal canal    -  Primary ICD-10-CM: M48.02 
ICD-9-CM: 723.0 Lumbar stenosis with neurogenic claudication     ICD-10-CM: M48.062 
ICD-9-CM: 724.03 Numbness     ICD-10-CM: R20.0 ICD-9-CM: 782.0 Pain in both feet     ICD-10-CM: M79.671, U60.736 ICD-9-CM: 729.5 Bilateral carpal tunnel syndrome     ICD-10-CM: G56.03 
ICD-9-CM: 354.0 Vitals BP Resp Height(growth percentile) Weight(growth percentile) BMI  
 122/74 20 5' 2\" (1.575 m) 162 lb (73.5 kg) 29.63 kg/m2 Vitals History BMI and BSA Data Body Mass Index Body Surface Area  
 29.63 kg/m 2 1.79 m 2 Preferred Pharmacy Pharmacy Name Phone [facility], [facility] 53 [address] ([address] [phone] Your Updated Medication List  
  
   
This list is accurate as of: 2/19/18 11:06 AM.  Always use your most recent med list.  
  
 ALEVE 220 mg tablet Generic drug:  naproxen sodium Take 220 mg by mouth two (2) times daily (with meals). ALPRAZolam 1 mg tablet Commonly known as:  Oliva Mettle TK 1 T PO TID PRN  
  
 clonazePAM 1 mg disintegrating tablet Commonly known as:  KlonoPIN  
PLACE 1 WAFTER ON TONGUE AND LET DISSOLVE TID PRN  
  
 dextroamphetamine-amphetamine 20 mg tablet Commonly known as:  ADDERALL TK 1 T PO AT 8 AM AND 1 TPO  AT NOON  
  
 morphine CR 30 mg CR tablet Commonly known as:  MS CONTIN  
TK 1 T PO BID  
  
 oxyCODONE IR 5 mg immediate release tablet Commonly known as:  ROXICODONE  
TK 1 T PO Q 6 H FOR CHRONIC PAIN  
  
 traZODone 100 mg tablet Commonly known as:  DESYREL  
TK 2 TS PO QHS  
  
 tretinoin 0.025 % topical cream  
Commonly known as:  RETIN-A  
  
 venlafaxine 75 mg tablet Commonly known as:  [facility] TK 2 T IN THE MORNING AND 1 T PO AT NOON We Performed the Following CBC WITH AUTOMATED DIFF [14093 CPT(R)] METABOLIC PANEL, COMPREHENSIVE [49585 CPT(R)] REFERRAL TO NEUROSURGERY [IEK62 Custom] Comments: [facility] neurosurgery for lumbar stenosis and carpal tunnel release To-Do List   
 02/20/2018 Lab:  TSH 3RD GENERATION   
  
 02/20/2018 Imaging:  XR FOOT LT MIN 3 V   
  
 02/20/2018 Imaging:  XR FOOT RT MIN 3 V Referral Information Referral ID Referred By Referred To  
  
 [id number] Carol Adams Not Available Visits Status Start Date End Date 1 New Request 2/19/18 2/19/19 If your referral has a status of pending review or denied, additional information will be sent to support the outcome of this decision. Patient Instructions Keyanna Serrano 1721 What is a living will? A living will is a legal form you use to write down the kind of care you want at the end of your life. It is used by the health professionals who will treat you if you aren't able to decide for yourself. If you put your wishes in writing, your loved ones and others will know what kind of care you want. They won't need to guess. This can ease your mind and be helpful to others. A living will is not the same as an estate or property will. An estate will explains what you want to happen with your money and property after you die. Is a living will a legal document? A living will is a legal document. Each state has its own laws about living newman. If you move to another state, make sure that your living will is legal in the state where you now live. Or you might use a universal form that has been approved by many states. This kind of form can sometimes be completed and stored online. Your electronic copy will then be available wherever you have a connection to the Internet. In most cases, doctors will respect your wishes even if you have a form from a different state. · You don't need an  to complete a living will. But legal advice can be helpful if your state's laws are unclear, your health history is complicated, or your family can't agree on what should be in your living will. · You can change your living will at any time. Some people find that their wishes about end-of-life care change as their health changes. · In addition to making a living will, think about completing a medical power of  form. This form lets you name the person you want to make end-of-life treatment decisions for you (your \"health care agent\") if you're not able to. Many hospitals and nursing homes will give you the forms you need to complete a living will and a medical power of . · Your living will is used only if you can't make or communicate decisions for yourself anymore. If you become able to make decisions again, you can accept or refuse any treatment, no matter what you wrote in your living will. · Your state may offer an online registry.  This is a place where you can store your living will online so the doctors and nurses who need to treat you can find it right away. What should you think about when creating a living will? Talk about your end-of-life wishes with your family members and your doctor. Let them know what you want. That way the people making decisions for you won't be surprised by your choices. Think about these questions as you make your living will: · Do you know enough about life support methods that might be used? If not, talk to your doctor so you know what might be done if you can't breathe on your own, your heart stops, or you're unable to swallow. · What things would you still want to be able to do after you receive life-support methods? Would you want to be able to walk? To speak? To eat on your own? To live without the help of machines? · If you have a choice, where do you want to be cared for? In your home? At a hospital or nursing home? · Do you want certain Jehovah's witness practices performed if you become very ill? · If you have a choice at the end of your life, where would you prefer to die? At home? In a hospital or nursing home? Somewhere else? · Would you prefer to be buried or cremated? · Do you want your organs to be donated after you die? What should you do with your living will? · Make sure that your family members and your health care agent have copies of your living will. · Give your doctor a copy of your living will to keep in your medical record. If you have more than one doctor, make sure that each one has a copy. · You may want to put a copy of your living will where it can be easily found. Where can you learn more? Go to http://mahi-mara.info/. Enter S641 in the search box to learn more about \"Learning About Living Perroy. \" Current as of: September 24, 2016 Content Version: 11.4 © 6373-3085 Healthwise, Incorporated.  Care instructions adapted under license by [address] (which disclaims liability or warranty for this information). If you have questions about a medical condition or this instruction, always ask your healthcare professional. [address] any warranty or liability for your use of this information. Advance Directives: Care Instructions Your Care Instructions An advance directive is a legal way to state your wishes at the end of your life. It tells your family and your doctor what to do if you can no longer say what you want. There are two main types of advance directives. You can change them any time that your wishes change. · A living will tells your family and your doctor your wishes about life support and other treatment. · A durable power of  for health care lets you name a person to make treatment decisions for you when you can't speak for yourself. This person is called a health care agent. If you do not have an advance directive, decisions about your medical care may be made by a doctor or a  who doesn't know you. It may help to think of an advance directive as a gift to the people who care for you. If you have one, they won't have to make tough decisions by themselves. Follow-up care is a key part of your treatment and safety. Be sure to make and go to all appointments, and call your doctor if you are having problems. It's also a good idea to know your test results and keep a list of the medicines you take. How can you care for yourself at home? · Discuss your wishes with your loved ones and your doctor. This way, there are no surprises. · Many states have a unique form. Or you might use a universal form that has been approved by many states. This kind of form can sometimes be completed and stored online. Your electronic copy will then be available wherever you have a connection to the Internet.  In most cases, doctors will respect your wishes even if you have a form from a different state. · You don't need a  to do an advance directive. But you may want to get legal advice. · Think about these questions when you prepare an advance directive: ¨ Who do you want to make decisions about your medical care if you are not able to? Many people choose a family member or close friend. ¨ Do you know enough about life support methods that might be used? If not, talk to your doctor so you understand. ¨ What are you most afraid of that might happen? You might be afraid of having pain, losing your independence, or being kept alive by machines. ¨ Where would you prefer to die? Choices include your home, a hospital, or a nursing home. ¨ Would you like to have information about hospice care to support you and your family? ¨ Do you want to donate organs when you die? ¨ Do you want certain Zoroastrianism practices performed before you die? If so, put your wishes in the advance directive. · Read your advance directive every year, and make changes as needed. When should you call for help? Be sure to contact your doctor if you have any questions. Where can you learn more? Go to http://mahi-mara.info/. Enter R264 in the search box to learn more about \"Advance Directives: Care Instructions. \" Current as of: September 24, 2016 Content Version: 11.4 © 0923-8125 Healthwise, Incorporated. Care instructions adapted under license by [employer] (which disclaims liability or warranty for this information). If you have questions about a medical condition or this instruction, always ask your healthcare professional. [address] [address] any warranty or liability for your use of this information. PRESCRIPTION REFILL POLICY [facility] Statement to Patients April 1, 2014 In an effort to ensure the large volume of patient prescription refills is processed in the most efficient and expeditious manner, we are asking our patients to assist us by calling your Pharmacy for all prescription refills, this will include also your  Mail Order Pharmacy. The pharmacy will contact our office electronically to continue the refill process. Please do not wait until the last minute to call your pharmacy. We need at least 48 hours (2days) to fill prescriptions. We also encourage you to call your pharmacy before going to  your prescription to make sure it is ready. With regard to controlled substance prescription refill requests (narcotic refills) that need to be picked up at our office, we ask your cooperation by providing us with at least 72 hours (3days) notice that you will need a refill. We will not refill narcotic prescription refill requests after 4:00pm on any weekday, Monday through Thursday, or after 2:00pm on Fridays, or on the weekends. We encourage everyone to explore another way of getting your prescription refill request processed using [employer], our patient web portal through our electronic medical record system. [employer] is an efficient and effective way to communicate your medication request directly to the office and  downloadable as an gregorio on your smart phone . [employer] also features a review functionality that allows you to view your medication list as well as leave messages for your physician. Are you ready to get connected? If so please review the attatched instructions or speak to any of our staff to get you set up right away! Thank you so much for your cooperation. Should you have any questions please contact our Practice Administrator. The Physicians and Staff,  [facility] Introducing [facility] SERVICES! Dear [place]: Thank you for requesting a [employer] account. Our records indicate that you already have an active [employer] account.   You can access your account anytime at [email or web address] Did you know that you can access your hospital and ER discharge instructions at any time in [employer]? You can also review all of your test results from your hospital stay or ER visit. Additional Information If you have questions, please visit the Frequently Asked Questions section of the [employer] website at [email or web address] Remember, [employer] is NOT to be used for urgent needs. For medical emergencies, dial 911. Now available from your iPhone and Android! Please provide this summary of care documentation to your next provider. Your primary care clinician is listed as Karina Madrid. If you have any questions after today's visit, please call [phone].

## 2018-02-19 NOTE — PROGRESS NOTES
Neurology Progress Note    Patient ID:  Kaitlin Henderson  [id number]  45 y.o.  1965    HISTORY PROVIDED BY:  Patient      Chief Complaint: Numbness and pain  Subjective:    Ms. Bigg Dean is here for follow up today of numbness and pain. .  She is having some headaches. Headaches were sharp shooting pain. Since last visit she has been off her Cymbalta and amitriptyline due to potential side effects of sleepiness and could not function with them. She also like her memory is off. Now she just continues on morphine, hydrocodone, Klonopin as needed, and trazodone. This is managed by pain management. Patient is feeling she is having pain in both of her feet, she questions if she fractured them. She does have frequent falls. She continues to have neck and back pain. She has tremor in all 4 extremities. She is numbness in the hands especially. Neurosurgery did not feel like they have anything to offer her. Will send to [facility]. Patient will still get some leg weakness. She has no bowel or bladder incontinence. She will get dysphasia at times. She is previously failed gabapentin and steroids. She also did not do well with amitriptyline and Cymbalta. Patient pain management was to do a nerve ablation but she is concerned about trying this. Patient has not had any updated imaging or labs done recently. Recap:  She is having continued pain and weakness. She did have imaging done with a CT head, CT C spine, and CT L spine. CT of head was essentially negative. CT of the cervical and lumbar spine showed the areas of previous fusion. In her lumbar spine she has severe stenosis above the level of fusion. Patient did have surgery in 2010 of L3 through S1. At last visit we did try patient on Cymbalta. She had vomiting and mood issues. She stopped taking it. 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 Imaging was negative. This is likely physiologic. Patient continues to have balance issues.   She is following the pain management to be on morphine for her chronic pain. Her surgeon that the previous surgeries has moved. She is interested in seeing someone else. Objective:   ROS:  Per HPI-positive for numbness, headaches, and foot pain  Otherwise 12 point ROS was negative    Meds:  Current Outpatient Prescriptions on File Prior to Visit   Medication Sig Dispense Refill    morphine CR (MS CONTIN) 30 mg CR tablet TK 1 T PO BID  0    traZODone (DESYREL) 100 mg tablet TK 2 TS PO QHS  1    amitriptyline (ELAVIL) 25 mg tablet Take 1 Tab by mouth nightly. 30 Tab 5    naproxen sodium (ALEVE) 220 mg tablet Take 220 mg by mouth two (2) times daily (with meals).  DULoxetine (CYMBALTA) 30 mg capsule Take 1 Cap by mouth daily. 30 Cap 5    ALPRAZolam (XANAX) 1 mg tablet TK 1 T PO TID PRN  1    clonazePAM (KLONOPIN) 1 mg disintegrating tablet PLACE 1 WAFTER ON TONGUE AND LET DISSOLVE TID PRN  1    oxyCODONE-acetaminophen (PERCOCET 10)  mg per tablet TK 1 T PO QD PRN  0    venlafaxine (EFFEXOR) 75 mg tablet TK 2 T IN THE MORNING AND 1 T PO AT NOON  1    tretinoin (RETIN-A) 0.025 % topical cream   0    dextroamphetamine-amphetamine (ADDERALL) 20 mg tablet TK 1 T PO AT 8 AM AND 1 TPO  AT NOON  0    baclofen (LIORESAL) 10 mg tablet Take  by mouth three (3) times daily. No current facility-administered medications on file prior to visit.         Imaging:  CT head: Negative (I personally reviewed these images in PACS and this is my impression)  CT C-spine: Stable fusion C3 through 7  CT L-spine fusion L3 through S1 with severe stenosis at all levels but most significant at L1/2  EMG/NCS: Negative  Reviewed records in [facility] [place] and media tab today    Lab Review   Results for orders placed or performed during the hospital encounter of 03/02/10   CULTURE, BLOOD, PAIRED   Result Value Ref Range    Specimen Description: BLOOD     Special Requests: NO SPECIAL REQUESTS     Culture result: NO GROWTH 5 DAYS Report Status [id number] FINAL    HGB & HCT   Result Value Ref Range    HGB 9.9 (L) 11.5 - 16.0 g/dL    HCT 29.7 (L) 35.0 - 47.0 %   HGB & HCT   Result Value Ref Range    HGB 8.5 (L) 11.5 - 16.0 g/dL    HCT 26.1 (L) 35.0 - 47.0 %   HGB & HCT   Result Value Ref Range    HGB 9.7 (L) 11.5 - 16.0 g/dL    HCT 29.5 (L) 35.0 - 47.0 %   URINALYSIS W/ REFLEX CULTURE   Result Value Ref Range    Color YELLOW     Appearance CLEAR     Specific gravity 1.015 1.003 - 1.030      pH (UA) 6.0 5.0 - 8.0      Protein NEGATIVE  NEGATIVE MG/DL    Glucose NEGATIVE  NEGATIVE MG/DL    Ketone NEGATIVE  NEGATIVE MG/DL    Bilirubin NEGATIVE  NEGATIVE    Blood NEGATIVE  NEGATIVE    Urobilinogen 0.2 0.2 - 1.0 EU/DL    Nitrites NEGATIVE  NEGATIVE    Leukocyte Esterase NEGATIVE  NEGATIVE    UA:UC IF INDICATED CULTURE NOT INDICATED BY UA RESULT     WBC 0-4 0 - 4 /HPF    RBC 0-3 0 - 5 /HPF    Epithelial cells 0-5 0 - 5 /LPF    Bacteria NEGATIVE  NEGATIVE /HPF    Hyaline cast 0-2 0 - 2   INFLUENZA A & B AG   Result Value Ref Range    Influenza A Antigen NEGATIVE  NEGATIVE    Influenza B Antigen NEGATIVE  NEGATIVE   METABOLIC PANEL, COMPREHENSIVE   Result Value Ref Range    Sodium 143 136 - 145 MMOL/L    Potassium 3.4 (L) 3.5 - 5.1 MMOL/L    Chloride 105 97 - 108 MMOL/L    CO2 30 21 - 32 MMOL/L    Anion gap 8 5 - 15 mmol/L    Glucose 141 (H) 50 - 100 MG/DL    BUN 4 (L) 6 - 20 MG/DL    Creatinine 0.9 0.6 - 1.3 MG/DL    BUN/Creatinine ratio 4 (L) 12 - 20      GFR est AA >60 >60 ml/min/1.73m2    GFR est non-AA >60 >60 ml/min/1.73m2    Calcium 8.0 (L) 8.5 - 10.1 MG/DL    Bilirubin, total 0.2 0.2 - 1.0 MG/DL    ALT (SGPT) 32 30 - 65 U/L    AST (SGOT) 26 15 - 37 U/L    Alk.  phosphatase 56 50 - 136 U/L    Protein, total 5.2 (L) 6.4 - 8.2 g/dL    Albumin 2.7 (L) 3.5 - 5.0 g/dL    Globulin 2.5 2.0 - 4.0 g/dL    A-G Ratio 1.1 1.1 - 2.2     CBC W/O DIFF   Result Value Ref Range    WBC 10.9 3.6 - 11.0 K/uL    RBC 2.59 (L) 3.80 - 5.20 M/uL    HGB 8.3 (L) 11.5 - 16.0 g/dL    HCT 25.1 (L) 35.0 - 47.0 %    MCV 96.9 80.0 - 99.0 FL    MCH 32.0 26.0 - 34.0 PG    MCHC 33.1 30.0 - 36.5 g/dL    RDW 13.4 11.5 - 14.5 %    PLATELET 646 511 - 152 K/uL   CBC W/ AUTOMATED DIFF   Result Value Ref Range    WBC 10.6 3.6 - 11.0 K/uL    RBC 2.70 (L) 3.80 - 5.20 M/uL    HGB 8.7 (L) 11.5 - 16.0 g/dL    HCT 25.7 (L) 35.0 - 47.0 %    MCV 95.2 80.0 - 99.0 FL    MCH 32.2 26.0 - 34.0 PG    MCHC 33.9 30.0 - 36.5 g/dL    RDW 12.7 11.5 - 14.5 %    PLATELET 906 559 - 573 K/uL    NEUTROPHILS 67 32 - 75 %    LYMPHOCYTES 15 12 - 49 %    MONOCYTES 13 5 - 13 %    EOSINOPHILS 5 0 - 7 %    BASOPHILS 0 0 - 1 %    ABS. NEUTROPHILS 7.1 1.8 - 8.0 K/UL    ABS. LYMPHOCYTES 1.6 0.8 - 3.5 K/UL    ABS. MONOCYTES 1.4 (H) 0.0 - 1.0 K/UL    ABS. EOSINOPHILS 0.5 (H) 0.0 - 0.4 K/UL    ABS. BASOPHILS 0.0 0.0 - 0.1 K/UL     EMG/NCS:  Findings:    1. EMG assessment of all 4 extremities and affiliated paraspinals failed to confidently delineate acute denervation or chronic denervation/reinnervation features or myopathic potentials. The uniform theme among motor recruitment was no realistic or sustained recruitment effort obtained at any of the muscle encounters. At best, muscle recruitment was easily less than 20%. 2.  Nerve conduction portion was normal except for the isolated finding of slight delay in the right ulnar motor latency at the wrist and diminished nerve conduction velocity in the same ulnar nerve above elbow segment with maintenance of motor amplitude above and below elbow. Impression:    1. No clearly defined neuropathic or myopathic disease uncovered with suboptimal patient effort at all muscles tested. 2.  Subtle conduction block of right ulnar motor nerve at the wrist and a possible suggestion of right ulnar nerve compression in the above elbow segment of uncertain if any significance.     Exam:  Visit Vitals    /74    Resp 20    Ht 5' 2\" (1.575 m)    Wt 73.5 kg (162 lb)    BMI 29.63 kg/m2     Gen: Well developed  CV: RRR  Lungs: non labored breathing  Abd: non distending  Neuro: A&O x 3, no dysarthria or aphasia  CN II-XII: Right pupil 5 mm, left pupil 3 mm this is the same in light and dark, EOMI, face symmetric, tongue/palate midline  Motor: strength 5/5 all four ext except some mild weakness right IHM  Sensory: intact to LT  Gait: Antalgic gait    Assessment:   Genaro Louis is a 45 y.o. female who presents for follow up of generalized numbness. 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  She does have lumbar stenosis on updated imaging. We will send to [address] [place] [address] neurosurgery for evaluation of this. She is also having numbness and neuropathic pain. I think this is secondary to the lumbar disc disease. Additionally she does have carpal tunnel syndrome. She is getting some weakness especially on the right side. We will need to treat for this as well. Plan:   1. CT head/C-spine/L-spine as above  2. Off amitriptyline and Cymbalta. Patient does not want to start any new medications. She has previously failed gabapentin and steroids. 3.  Referral to neurosurgery at [address] [place] [address] for CTS release and lumbar stenosis  4. Encouraged patient to keep her pain management appointment as I do not do narcotics  5. We will check basic labs today as patient has not had this done recently and she is having some continued numbness/pain  6. Will do x-rays of feet given her multiple falls and pain    Follow-up 3 months    Signed:  Michele Myers MD  2/19/2018    This note was created using voice recognition software. Despite editing, there may be syntax errors. This note will not be viewable in [address]

## 2018-02-19 NOTE — LETTER
Reviewed record in preparation for visit and have necessary documentation Pt did not bring medication to office visit for review Medication list reviewed and reconciled with patient Information was given to pt on Advanced Directives, Living Will 
opportunity was given for questions Neurology Progress Note Patient ID: Tino Mcclain [id number] 
45 y.o. 
1965 HISTORY PROVIDED BY: 
Patient Chief Complaint: Numbness and pain Subjective:  
 Ms. Rashid Phillip is here for follow up today of numbness and pain. .  She is having some headaches. Headaches were sharp shooting pain. Since last visit she has been off her Cymbalta and amitriptyline due to potential side effects of sleepiness and could not function with them. She also like her memory is off. Now she just continues on morphine, hydrocodone, Klonopin as needed, and trazodone. This is managed by pain management. Patient is feeling she is having pain in both of her feet, she questions if she fractured them. She does have frequent falls. She continues to have neck and back pain. She has tremor in all 4 extremities. She is numbness in the hands especially. Neurosurgery did not feel like they have anything to offer her. Will send to [facility]. Patient will still get some leg weakness. She has no bowel or bladder incontinence. She will get dysphasia at times. She is previously failed gabapentin and steroids. She also did not do well with amitriptyline and Cymbalta. Patient pain management was to do a nerve ablation but she is concerned about trying this. Patient has not had any updated imaging or labs done recently. Recap: She is having continued pain and weakness. She did have imaging done with a CT head, CT C spine, and CT L spine. CT of head was essentially negative. CT of the cervical and lumbar spine showed the areas of previous fusion. In her lumbar spine she has severe stenosis above the level of fusion. Patient did have surgery in 2010 of L3 through S1. At last visit we did try patient on Cymbalta. She had vomiting and mood issues. She stopped taking it. 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 Imaging was negative. This is likely physiologic. Patient continues to have balance issues. She is following the pain management to be on morphine for her chronic pain. Her surgeon that the previous surgeries has moved. She is interested in seeing someone else. Objective:  
ROS: 
Per HPI-positive for numbness, headaches, and foot pain Otherwise 12 point ROS was negative Meds: 
Current Outpatient Prescriptions on File Prior to Visit Medication Sig Dispense Refill  morphine CR (MS CONTIN) 30 mg CR tablet TK 1 T PO BID  0  
 traZODone (DESYREL) 100 mg tablet TK 2 TS PO QHS  1  
 amitriptyline (ELAVIL) 25 mg tablet Take 1 Tab by mouth nightly. 30 Tab 5  
 naproxen sodium (ALEVE) 220 mg tablet Take 220 mg by mouth two (2) times daily (with meals).  DULoxetine (CYMBALTA) 30 mg capsule Take 1 Cap by mouth daily. 30 Cap 5  ALPRAZolam (XANAX) 1 mg tablet TK 1 T PO TID PRN  1  
 clonazePAM (KLONOPIN) 1 mg disintegrating tablet PLACE 1 WAFTER ON TONGUE AND LET DISSOLVE TID PRN  1  
 oxyCODONE-acetaminophen (PERCOCET 10)  mg per tablet TK 1 T PO QD PRN  0  
 venlafaxine (EFFEXOR) 75 mg tablet TK 2 T IN THE MORNING AND 1 T PO AT NOON  1  
 tretinoin (RETIN-A) 0.025 % topical cream   0  
 dextroamphetamine-amphetamine (ADDERALL) 20 mg tablet TK 1 T PO AT 8 AM AND 1 TPO  AT NOON  0  
 baclofen (LIORESAL) 10 mg tablet Take  by mouth three (3) times daily. No current facility-administered medications on file prior to visit. Imaging: CT head: Negative (I personally reviewed these images in PACS and this is my impression) CT C-spine: Stable fusion C3 through 7 CT L-spine fusion L3 through S1 with severe stenosis at all levels but most significant at L1/2 
 EMG/NCS: Negative Reviewed records in [employer] and media tab today Lab Review Results for orders placed or performed during the hospital encounter of 03/02/10 CULTURE, BLOOD, PAIRED Result Value Ref Range Specimen Description: BLOOD Special Requests: NO SPECIAL REQUESTS Culture result: NO GROWTH 5 DAYS Report Status [id number] FINAL   
HGB & HCT Result Value Ref Range HGB 9.9 (L) 11.5 - 16.0 g/dL HCT 29.7 (L) 35.0 - 47.0 % HGB & HCT Result Value Ref Range HGB 8.5 (L) 11.5 - 16.0 g/dL HCT 26.1 (L) 35.0 - 47.0 % HGB & HCT Result Value Ref Range HGB 9.7 (L) 11.5 - 16.0 g/dL HCT 29.5 (L) 35.0 - 47.0 % URINALYSIS W/ REFLEX CULTURE Result Value Ref Range Color YELLOW Appearance CLEAR Specific gravity 1.015 1.003 - 1.030    
 pH (UA) 6.0 5.0 - 8.0 Protein NEGATIVE  NEGATIVE MG/DL Glucose NEGATIVE  NEGATIVE MG/DL Ketone NEGATIVE  NEGATIVE MG/DL Bilirubin NEGATIVE  NEGATIVE Blood NEGATIVE  NEGATIVE Urobilinogen 0.2 0.2 - 1.0 EU/DL Nitrites NEGATIVE  NEGATIVE Leukocyte Esterase NEGATIVE  NEGATIVE  
 UA:UC IF INDICATED CULTURE NOT INDICATED BY UA RESULT   
 WBC 0-4 0 - 4 /HPF  
 RBC 0-3 0 - 5 /HPF Epithelial cells 0-5 0 - 5 /LPF Bacteria NEGATIVE  NEGATIVE /HPF Hyaline cast 0-2 0 - 2 INFLUENZA A & B AG Result Value Ref Range Influenza A Antigen NEGATIVE  NEGATIVE Influenza B Antigen NEGATIVE  NEGATIVE METABOLIC PANEL, COMPREHENSIVE Result Value Ref Range Sodium 143 136 - 145 MMOL/L Potassium 3.4 (L) 3.5 - 5.1 MMOL/L Chloride 105 97 - 108 MMOL/L  
 CO2 30 21 - 32 MMOL/L Anion gap 8 5 - 15 mmol/L Glucose 141 (H) 50 - 100 MG/DL  
 BUN 4 (L) 6 - 20 MG/DL Creatinine 0.9 0.6 - 1.3 MG/DL  
 BUN/Creatinine ratio 4 (L) 12 - 20 GFR est AA >60 >60 ml/min/1.73m2 GFR est non-AA >60 >60 ml/min/1.73m2 Calcium 8.0 (L) 8.5 - 10.1 MG/DL  Bilirubin, total 0.2 0.2 - 1.0 MG/DL  
 ALT (SGPT) 32 30 - 65 U/L  
 AST (SGOT) 26 15 - 37 U/L Alk. phosphatase 56 50 - 136 U/L Protein, total 5.2 (L) 6.4 - 8.2 g/dL Albumin 2.7 (L) 3.5 - 5.0 g/dL Globulin 2.5 2.0 - 4.0 g/dL A-G Ratio 1.1 1.1 - 2.2    
CBC W/O DIFF Result Value Ref Range WBC 10.9 3.6 - 11.0 K/uL  
 RBC 2.59 (L) 3.80 - 5.20 M/uL HGB 8.3 (L) 11.5 - 16.0 g/dL HCT 25.1 (L) 35.0 - 47.0 % MCV 96.9 80.0 - 99.0 FL  
 MCH 32.0 26.0 - 34.0 PG  
 MCHC 33.1 30.0 - 36.5 g/dL  
 RDW 13.4 11.5 - 14.5 % PLATELET 911 356 - 962 K/uL CBC W/ AUTOMATED DIFF Result Value Ref Range WBC 10.6 3.6 - 11.0 K/uL  
 RBC 2.70 (L) 3.80 - 5.20 M/uL HGB 8.7 (L) 11.5 - 16.0 g/dL HCT 25.7 (L) 35.0 - 47.0 % MCV 95.2 80.0 - 99.0 FL  
 MCH 32.2 26.0 - 34.0 PG  
 MCHC 33.9 30.0 - 36.5 g/dL  
 RDW 12.7 11.5 - 14.5 % PLATELET 173 483 - 553 K/uL NEUTROPHILS 67 32 - 75 % LYMPHOCYTES 15 12 - 49 % MONOCYTES 13 5 - 13 % EOSINOPHILS 5 0 - 7 % BASOPHILS 0 0 - 1 %  
 ABS. NEUTROPHILS 7.1 1.8 - 8.0 K/UL  
 ABS. LYMPHOCYTES 1.6 0.8 - 3.5 K/UL  
 ABS. MONOCYTES 1.4 (H) 0.0 - 1.0 K/UL  
 ABS. EOSINOPHILS 0.5 (H) 0.0 - 0.4 K/UL  
 ABS. BASOPHILS 0.0 0.0 - 0.1 K/UL  
 
EMG/NCS: 
Findings: 
 
1. EMG assessment of all 4 extremities and affiliated paraspinals failed to confidently delineate acute denervation or chronic denervation/reinnervation features or myopathic potentials. The uniform theme among motor recruitment was no realistic or sustained recruitment effort obtained at any of the muscle encounters. At best, muscle recruitment was easily less than 20%. 2.  Nerve conduction portion was normal except for the isolated finding of slight delay in the right ulnar motor latency at the wrist and diminished nerve conduction velocity in the same ulnar nerve above elbow segment with maintenance of motor amplitude above and below elbow. Impression: 1.   No clearly defined neuropathic or myopathic disease uncovered with suboptimal patient effort at all muscles tested. 2.  Subtle conduction block of right ulnar motor nerve at the wrist and a possible suggestion of right ulnar nerve compression in the above elbow segment of uncertain if any significance. Exam: 
Visit Vitals  /74  Resp 20  
 Ht 5' 2\" (1.575 m)  Wt 73.5 kg (162 lb)  BMI 29.63 kg/m2 Gen: Well developed CV: RRR Lungs: non labored breathing Abd: non distending Neuro: A&O x 3, no dysarthria or aphasia CN II-XII: Right pupil 5 mm, left pupil 3 mm this is the same in light and dark, EOMI, face symmetric, tongue/palate midline Motor: strength 5/5 all four ext except some mild weakness right IHM Sensory: intact to LT Gait: Antalgic gait Assessment:  
Faisal Templeton is a 45 y.o. female who presents for follow up of generalized numbness. 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  She does have lumbar stenosis on updated imaging. We will send to [facility] neurosurgery for evaluation of this. She is also having numbness and neuropathic pain. I think this is secondary to the lumbar disc disease. Additionally she does have carpal tunnel syndrome. She is getting some weakness especially on the right side. We will need to treat for this as well. Plan: 1. CT head/C-spine/L-spine as above 2. Off amitriptyline and Cymbalta. Patient does not want to start any new medications. She has previously failed gabapentin and steroids. 3.  Referral to neurosurgery at [facility] for CTS release and lumbar stenosis 4. Encouraged patient to keep her pain management appointment as I do not do narcotics 5. We will check basic labs today as patient has not had this done recently and she is having some continued numbness/pain 6. Will do x-rays of feet given her multiple falls and pain Follow-up 3 months Signed: 
Rosalba Morfin MD 
2/19/2018 This note was created using voice recognition software.  Despite editing, there may be syntax errors. This note will not be viewable in [address]

## 2018-02-20 DIAGNOSIS — R20.0 NUMBNESS: ICD-10-CM

## 2018-05-30 ENCOUNTER — TELEPHONE (OUTPATIENT)
Dept: NEUROLOGY | Age: 53
End: 2018-05-30

## 2018-05-30 NOTE — TELEPHONE ENCOUNTER
----- Message from Kristie Jaime sent at 5/30/2018  3:33 PM EDT -----  Regarding: Dr. Joseph Armstrong  Pt needs Ana Laura Foster to refer her to another pain management specialist due to her previous pain management office closing as of 4/7/18, because they moved to [place], [place]. Pt was seen by a physician at the foot center located in [facility] yesterday due to her foot turning blue, but found out not the foot it was perial neuropathy. The foot specialist told the pt that she may have had a mild stroke. Pt is scheduled to be seen on 6/18/18 to  follow up on yesterday foot clinic appt. Pt will run out of her pain medication soon. Pt can be reached at [phone] or [phone].

## 2018-05-31 DIAGNOSIS — M79.672 PAIN IN LEFT FOOT: ICD-10-CM

## 2018-05-31 DIAGNOSIS — M79.671 PAIN IN RIGHT FOOT: ICD-10-CM

## 2018-05-31 DIAGNOSIS — M48.02 CERVICAL STENOSIS OF SPINAL CANAL: ICD-10-CM

## 2018-05-31 DIAGNOSIS — M48.062 LUMBAR STENOSIS WITH NEUROGENIC CLAUDICATION: Primary | ICD-10-CM

## 2018-05-31 NOTE — TELEPHONE ENCOUNTER
Order placed for Pain managment, # due to chronic pain, per Verbal Order from Dr. Jasmyn Campbell on 5/31/2018 due to Chronic pain.

## 2018-06-18 ENCOUNTER — OFFICE VISIT (OUTPATIENT)
Dept: NEUROLOGY | Age: 53
End: 2018-06-18

## 2018-06-18 VITALS
BODY MASS INDEX: 28.89 KG/M2 | WEIGHT: 157 LBS | SYSTOLIC BLOOD PRESSURE: 106 MMHG | HEART RATE: 89 BPM | OXYGEN SATURATION: 90 % | RESPIRATION RATE: 16 BRPM | DIASTOLIC BLOOD PRESSURE: 72 MMHG | HEIGHT: 62 IN

## 2018-06-18 DIAGNOSIS — G40.219 LOCALZ-RLTD SYMPTOMATIC EPILEPSY W CMPLX PART SZ, INTRACT, WO STATUS (HCC): Primary | ICD-10-CM

## 2018-06-18 DIAGNOSIS — G57.30 PERONEAL NEUROPATHY, UNSPECIFIED LATERALITY: ICD-10-CM

## 2018-06-18 DIAGNOSIS — R20.2 NUMBNESS AND TINGLING: ICD-10-CM

## 2018-06-18 DIAGNOSIS — M48.02 CERVICAL SPINAL STENOSIS: ICD-10-CM

## 2018-06-18 DIAGNOSIS — R20.0 NUMBNESS AND TINGLING: ICD-10-CM

## 2018-06-18 NOTE — LETTER
Chief Complaint Patient presents with  Peripheral Neuropathy  
  hands/feet 1. Have you been to the ER, urgent care clinic since your last visit? Hospitalized since your last visit? No 
 
2. Have you seen or consulted any other health care providers outside of the [facility] since your last visit? Include any pap smears or colon screening. No  
 
 
Reviewed record in preparation for visit and have necessary documentation Pt did not bring medication to office visit for review Medication list reviewed and reconciled with patient Information was given to pt on Advanced Directives, Living Will 
opportunity was given for questions Neurology Progress Note Patient ID: Euna Kussmaul [id number] 
45 y.o. 
1965 HISTORY PROVIDED BY: 
Patient Chief Complaint: Numbness and pain Subjective:  
 Ms. Sandra Diaz is here for follow up today of numbness and pain. . Patient continues to have issues with pain. She has to take morphine and oxycodone. She has been having difficulty with pain management. Her previous pain management left the area. She is establishing with [facility] but does not have an appointment yet. It is scheduled for end of June. Patient reports that since her last visit she stated see a foot doctor. She is not clear if this was an orthopedist or podiatrist.  They noted that her left leg was more of an issue for her. They diagnosed her with peroneal neuropathy. Patient reports that she can loosen feeling in her legs and feel like she is going to fall but this is episodic. Since last visit we did refer to neurosurgery but she thinks it did not take her insurance so she did not go. [facility] There she had a spinal stimulator placed and they were unable to remove it. She did not have a good experience. The doctors there told her there is nothing really could do for her. Patient reports having multiple types of imaging, EMG/NCS, spinal stimulator, epidurals, etc. 
She reports that she was given a work evaluation and told that she is so severe she cannot do a job even sitting. Recap: She is having some headaches. Headaches were sharp shooting pain. Since last visit she has been off her Cymbalta and amitriptyline due to potential side effects of sleepiness and could not function with them. She also like her memory is off. Now she just continues on morphine, hydrocodone, Klonopin as needed, and trazodone. This is managed by pain management. Patient is feeling she is having pain in both of her feet, she questions if she fractured them. She does have frequent falls. She continues to have neck and back pain. She has tremor in all 4 extremities. She is numbness in the hands especially. Neurosurgery did not feel like they have anything to offer her. Will send to [facility]. Patient will still get some leg weakness. She has no bowel or bladder incontinence. She will get dysphasia at times. She is previously failed gabapentin and steroids. She also did not do well with amitriptyline and Cymbalta. Patient pain management was to do a nerve ablation but she is concerned about trying this. Patient has not had any updated imaging or labs done recently. Objective:  
ROS: 
Per HPI-positive for numbness, headaches, and foot pain Otherwise 12 point ROS was negative Meds: 
Current Outpatient Prescriptions on File Prior to Visit Medication Sig Dispense Refill  oxyCODONE IR (ROXICODONE) 5 mg immediate release tablet TK 1 T PO Q 6 H FOR CHRONIC PAIN  0  
 naproxen sodium (ALEVE) 220 mg tablet Take 220 mg by mouth two (2) times daily (with meals).     
 ALPRAZolam (XANAX) 1 mg tablet TK 1 T PO TID PRN  1  
 clonazePAM (KLONOPIN) 1 mg disintegrating tablet PLACE 1 WAFTER ON TONGUE AND LET DISSOLVE TID PRN  1  
 morphine CR (MS CONTIN) 30 mg CR tablet TK 1 T PO BID  0  
  traZODone (DESYREL) 100 mg tablet TK 2 TS PO QHS  1  
 venlafaxine (EFFEXOR) 75 mg tablet TK 2 T IN THE MORNING AND 1 T PO AT NOON  1  
 tretinoin (RETIN-A) 0.025 % topical cream   0  
 dextroamphetamine-amphetamine (ADDERALL) 20 mg tablet TK 1 T PO AT 8 AM AND 1 TPO  AT NOON  0 No current facility-administered medications on file prior to visit. Imaging: CT head: Negative (I personally reviewed these images in PACS and this is my impression) CT C-spine: Stable fusion C3 through 7 CT L-spine fusion L3 through S1 with severe stenosis at all levels but most significant at L1/2 EMG/NCS: Negative Reviewed records in [employer] and [employer] tab today Lab Review Results for orders placed or performed during the hospital encounter of 03/02/10 CULTURE, BLOOD, PAIRED Result Value Ref Range Specimen Description: BLOOD Special Requests: NO SPECIAL REQUESTS Culture result: NO GROWTH 5 DAYS Report Status [id number] FINAL   
HGB & HCT Result Value Ref Range HGB 9.9 (L) 11.5 - 16.0 g/dL HCT 29.7 (L) 35.0 - 47.0 % HGB & HCT Result Value Ref Range HGB 8.5 (L) 11.5 - 16.0 g/dL HCT 26.1 (L) 35.0 - 47.0 % HGB & HCT Result Value Ref Range HGB 9.7 (L) 11.5 - 16.0 g/dL HCT 29.5 (L) 35.0 - 47.0 % URINALYSIS W/ REFLEX CULTURE Result Value Ref Range Color YELLOW Appearance CLEAR Specific gravity 1.015 1.003 - 1.030    
 pH (UA) 6.0 5.0 - 8.0 Protein NEGATIVE  NEGATIVE MG/DL Glucose NEGATIVE  NEGATIVE MG/DL Ketone NEGATIVE  NEGATIVE MG/DL Bilirubin NEGATIVE  NEGATIVE Blood NEGATIVE  NEGATIVE Urobilinogen 0.2 0.2 - 1.0 EU/DL Nitrites NEGATIVE  NEGATIVE Leukocyte Esterase NEGATIVE  NEGATIVE  
 UA:UC IF INDICATED CULTURE NOT INDICATED BY UA RESULT   
 WBC 0-4 0 - 4 /HPF  
 RBC 0-3 0 - 5 /HPF Epithelial cells 0-5 0 - 5 /LPF Bacteria NEGATIVE  NEGATIVE /HPF Hyaline cast 0-2 0 - 2 INFLUENZA A & B AG Result Value Ref Range Influenza A Antigen NEGATIVE  NEGATIVE Influenza B Antigen NEGATIVE  NEGATIVE METABOLIC PANEL, COMPREHENSIVE Result Value Ref Range Sodium 143 136 - 145 MMOL/L Potassium 3.4 (L) 3.5 - 5.1 MMOL/L Chloride 105 97 - 108 MMOL/L  
 CO2 30 21 - 32 MMOL/L Anion gap 8 5 - 15 mmol/L Glucose 141 (H) 50 - 100 MG/DL  
 BUN 4 (L) 6 - 20 MG/DL Creatinine 0.9 0.6 - 1.3 MG/DL  
 BUN/Creatinine ratio 4 (L) 12 - 20 GFR est AA >60 >60 ml/min/1.73m2 GFR est non-AA >60 >60 ml/min/1.73m2 Calcium 8.0 (L) 8.5 - 10.1 MG/DL Bilirubin, total 0.2 0.2 - 1.0 MG/DL  
 ALT (SGPT) 32 30 - 65 U/L  
 AST (SGOT) 26 15 - 37 U/L Alk. phosphatase 56 50 - 136 U/L Protein, total 5.2 (L) 6.4 - 8.2 g/dL Albumin 2.7 (L) 3.5 - 5.0 g/dL Globulin 2.5 2.0 - 4.0 g/dL A-G Ratio 1.1 1.1 - 2.2    
CBC W/O DIFF Result Value Ref Range WBC 10.9 3.6 - 11.0 K/uL  
 RBC 2.59 (L) 3.80 - 5.20 M/uL HGB 8.3 (L) 11.5 - 16.0 g/dL HCT 25.1 (L) 35.0 - 47.0 % MCV 96.9 80.0 - 99.0 FL  
 MCH 32.0 26.0 - 34.0 PG  
 MCHC 33.1 30.0 - 36.5 g/dL  
 RDW 13.4 11.5 - 14.5 % PLATELET 548 502 - 609 K/uL CBC W/ AUTOMATED DIFF Result Value Ref Range WBC 10.6 3.6 - 11.0 K/uL  
 RBC 2.70 (L) 3.80 - 5.20 M/uL HGB 8.7 (L) 11.5 - 16.0 g/dL HCT 25.7 (L) 35.0 - 47.0 % MCV 95.2 80.0 - 99.0 FL  
 MCH 32.2 26.0 - 34.0 PG  
 MCHC 33.9 30.0 - 36.5 g/dL  
 RDW 12.7 11.5 - 14.5 % PLATELET 074 211 - 180 K/uL NEUTROPHILS 67 32 - 75 % LYMPHOCYTES 15 12 - 49 % MONOCYTES 13 5 - 13 % EOSINOPHILS 5 0 - 7 % BASOPHILS 0 0 - 1 %  
 ABS. NEUTROPHILS 7.1 1.8 - 8.0 K/UL  
 ABS. LYMPHOCYTES 1.6 0.8 - 3.5 K/UL  
 ABS. MONOCYTES 1.4 (H) 0.0 - 1.0 K/UL  
 ABS. EOSINOPHILS 0.5 (H) 0.0 - 0.4 K/UL  
 ABS. BASOPHILS 0.0 0.0 - 0.1 K/UL  
 
EMG/NCS: 
Findings: 
 
1.   EMG assessment of all 4 extremities and affiliated paraspinals failed to confidently delineate acute denervation or chronic denervation/reinnervation features or myopathic potentials. The uniform theme among motor recruitment was no realistic or sustained recruitment effort obtained at any of the muscle encounters. At best, muscle recruitment was easily less than 20%. 2.  Nerve conduction portion was normal except for the isolated finding of slight delay in the right ulnar motor latency at the wrist and diminished nerve conduction velocity in the same ulnar nerve above elbow segment with maintenance of motor amplitude above and below elbow. Impression: 1. No clearly defined neuropathic or myopathic disease uncovered with suboptimal patient effort at all muscles tested. 2.  Subtle conduction block of right ulnar motor nerve at the wrist and a possible suggestion of right ulnar nerve compression in the above elbow segment of uncertain if any significance. Exam: 
Visit Vitals  /72  Pulse 89  Resp 16  
 Ht 5' 2\" (1.575 m)  Wt 71.2 kg (157 lb)  SpO2 90%  BMI 28.72 kg/m2 Gen: Well developed CV: RRR Lungs: non labored breathing Abd: non distending Neuro: A&O x 3, no dysarthria or aphasia CN II-XII: Right pupil 5 mm, left pupil 3 mm this is the same in light and dark, EOMI, face symmetric, tongue/palate midline Motor: strength 5/5 all four ext except some mild weakness right IHM Sensory: intact to LT Gait: Antalgic gait Assessment:  
Shruthi Wong is a 45 y.o. female who presents for follow up of generalized numbness. 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  She does have lumbar stenosis on updated imaging. We will send to [facility] neurosurgery for evaluation of this. I do not think her previous referral actually went through. She is also having numbness and neuropathic pain. I think this is secondary to the lumbar disc disease. Additionally she does have carpal tunnel syndrome.   She is not interested in any neuropathic pain medications. Will make sure she establish with pain management for medication. Plan: 1. CT head/C-spine/L-spine as above 2. Off amitriptyline and Cymbalta. Patient does not want to start any new medications. She has previously failed gabapentin and steroids. 3.  Referral to neurosurgery at [facility] for CTS release and lumbar stenosis will send referral today 4. Encouraged patient to keep her pain management appointment as I do not do narcotics. Patient has an appointment set up with Zane Rosario 5. Discussed with patient we can repeat imaging and testing she has previously had but I do not think it will add to management 6. Discussed that in terms of peroneal neuropathy patient should avoid crossing her legs. Her previous EMG did not show any indication of peroneal neuropathy. Follow-up TBD Signed: 
Susan Lozada MD 
6/18/2018 Medications and side effects discussed with patient in detail. With any new medications prescribed, patient was given instructions on administration and side effects. Written medication information was provided to the patient as well. This note was created using voice recognition software. Despite editing, there may be syntax errors. This note will not be viewable in [address]

## 2018-06-18 NOTE — MR AVS SNAPSHOT
[address] [address] [address] [address] 
[phone] Patient: Zara Rivera MRN: [id number] Visit Information Date & Time Provider Department Dept. Phone Encounter #  
 6/18/2018  1:40 PM Kasia Doan MD [facility] [phone] [id number] Upcoming Health Maintenance Date Due Hepatitis C Screening 1965 DTaP/Tdap/Td series (1 - Tdap) 8/3/1984 PAP AKA CERVICAL CYTOLOGY 8/3/1984 BREAST CANCER SCRN MAMMOGRAM 8/3/2013 FOBT Q 1 YEAR AGE 50-75 8/3/2013 Influenza Age 5 to Adult 8/1/2018 Allergies as of 6/18/2018  Review Complete On: 6/18/2018 By: Kasia Doan MD  
  
 Severity Noted Reaction Type Reactions Cymbalta [Duloxetine]  10/18/2017    Nausea and Vomiting Other Medication  10/18/2017    Rash Steriods Current Immunizations  Never Reviewed No immunizations on file. Not reviewed this visit Vitals BP Pulse Resp Height(growth percentile) Weight(growth percentile) SpO2  
 106/72 89 16 5' 2\" (1.575 m) 157 lb (71.2 kg) 90% BMI  
  
  
  
  
 28.72 kg/m2 Vitals History BMI and BSA Data Body Mass Index Body Surface Area 28.72 kg/m 2 1.76 m 2 Preferred Pharmacy Pharmacy Name Phone [facility], [facility] [address] ([address] [phone] Your Updated Medication List  
  
   
This list is accurate as of 6/18/18  2:31 PM.  Always use your most recent med list.  
  
  
  
  
 ALEVE 220 mg tablet Generic drug:  naproxen sodium Take 220 mg by mouth two (2) times daily (with meals). ALPRAZolam 1 mg tablet Commonly known as:  Alice Sarah TK 1 T PO TID PRN  
  
 clonazePAM 1 mg Tbdi disintegrating tablet Commonly known as:  KlonoPIN  
PLACE 1 WAFTER ON TONGUE AND LET DISSOLVE TID PRN  
  
 dextroamphetamine-amphetamine 20 mg tablet Commonly known as:  ADDERALL TK 1 T PO AT 8 AM AND 1 TPO  AT NOON  
  
 morphine CR 30 mg CR tablet Commonly known as:  MS CONTIN  
TK 1 T PO BID  
  
 oxyCODONE IR 5 mg immediate release tablet Commonly known as:  ROXICODONE  
TK 1 T PO Q 6 H FOR CHRONIC PAIN  
  
 traZODone 100 mg tablet Commonly known as:  DESYREL  
TK 2 TS PO QHS  
  
 tretinoin 0.025 % topical cream  
Commonly known as:  RETIN-A  
  
 venlafaxine 75 mg tablet Commonly known as:  [facility] TK 2 T IN THE MORNING AND 1 T PO AT NOON Patient Instructions PRESCRIPTION REFILL POLICY [facility] Statement to Patients April 1, 2014 In an effort to ensure the large volume of patient prescription refills is processed in the most efficient and expeditious manner, we are asking our patients to assist us by calling your Pharmacy for all prescription refills, this will include also your  Mail Order Pharmacy. The pharmacy will contact our office electronically to continue the refill process. Please do not wait until the last minute to call your pharmacy. We need at least 48 hours (2days) to fill prescriptions. We also encourage you to call your pharmacy before going to  your prescription to make sure it is ready. With regard to controlled substance prescription refill requests (narcotic refills) that need to be picked up at our office, we ask your cooperation by providing us with at least 72 hours (3days) notice that you will need a refill. We will not refill narcotic prescription refill requests after 4:00pm on any weekday, Monday through Thursday, or after 2:00pm on Fridays, or on the weekends. We encourage everyone to explore another way of getting your prescription refill request processed using Snapfish, our patient web portal through our electronic medical record system.  [place] is an efficient and effective way to communicate your medication request directly to the office and downloadable as an gregorio on your smart phone . [employer] also features a review functionality that allows you to view your medication list as well as leave messages for your physician. Are you ready to get connected? If so please review the attatched instructions or speak to any of our staff to get you set up right away! Thank you so much for your cooperation. Should you have any questions please contact our Practice Administrator. The Physicians and Staff,  [address] [place] [address] Neurology [facility] Patient Instruction Plan/ Result Policy If we have ordered testing for you, know that; \"NO NEWS IS GOOD NEWS! \" It is our policy that we know longer call patients with results, nor do we  give test results over the phone. We schedule follow up appointments so that your results can be discussed in person. This allows you to address any questions you have regarding the results. If something of concern is revealed on your test, we will contact you to discuss the matter and if needed schedule a sooner follow up appointment. Additionally, results may be found by using the My Chart feature and one of our patient service representatives at the  can give you instructions on how to access this feature to utilize our electronic medical record system. Thank you for your understanding. Keyanna Serrano 4[address] What is a living will? A living will is a legal form you use to write down the kind of care you want at the end of your life. It is used by the health professionals who will treat you if you aren't able to decide for yourself. If you put your wishes in writing, your loved ones and others will know what kind of care you want. They won't need to guess. This can ease your mind and be helpful to others. A living will is not the same as an estate or property will. An estate will explains what you want to happen with your money and property after you die. Is a living will a legal document? A living will is a legal document. Each state has its own laws about living newman. If you move to another state, make sure that your living will is legal in the state where you now live. Or you might use a universal form that has been approved by many states. This kind of form can sometimes be completed and stored online. Your electronic copy will then be available wherever you have a connection to the Internet. In most cases, doctors will respect your wishes even if you have a form from a different state. · You don't need an  to complete a living will. But legal advice can be helpful if your state's laws are unclear, your health history is complicated, or your family can't agree on what should be in your living will. · You can change your living will at any time. Some people find that their wishes about end-of-life care change as their health changes. · In addition to making a living will, think about completing a medical power of  form. This form lets you name the person you want to make end-of-life treatment decisions for you (your \"health care agent\") if you're not able to. Many hospitals and nursing homes will give you the forms you need to complete a living will and a medical power of . · Your living will is used only if you can't make or communicate decisions for yourself anymore. If you become able to make decisions again, you can accept or refuse any treatment, no matter what you wrote in your living will. · Your state may offer an online registry. This is a place where you can store your living will online so the doctors and nurses who need to treat you can find it right away. What should you think about when creating a living will? Talk about your end-of-life wishes with your family members and your doctor. Let them know what you want. That way the people making decisions for you won't be surprised by your choices. Think about these questions as you make your living will: · Do you know enough about life support methods that might be used? If not, talk to your doctor so you know what might be done if you can't breathe on your own, your heart stops, or you're unable to swallow. · What things would you still want to be able to do after you receive life-support methods? Would you want to be able to walk? To speak? To eat on your own? To live without the help of machines? · If you have a choice, where do you want to be cared for? In your home? At a hospital or nursing home? · Do you want certain Jew practices performed if you become very ill? · If you have a choice at the end of your life, where would you prefer to die? At home? In a hospital or nursing home? Somewhere else? · Would you prefer to be buried or cremated? · Do you want your organs to be donated after you die? What should you do with your living will? · Make sure that your family members and your health care agent have copies of your living will. · Give your doctor a copy of your living will to keep in your medical record. If you have more than one doctor, make sure that each one has a copy. · You may want to put a copy of your living will where it can be easily found. Where can you learn more? Go to http://mahi-mara.info/. Enter M351 in the search box to learn more about \"Learning About Living Luiz. \" Current as of: September 24, 2016 Content Version: 11.4 © 5307-8095 diaDexus. Care instructions adapted under license by LinguaLeo (which disclaims liability or warranty for this information). If you have questions about a medical condition or this instruction, always ask your healthcare professional. [address] [place] [address] any warranty or liability for your use of this information. Advance Directives: Care Instructions Your Care Instructions An advance directive is a legal way to state your wishes at the end of your life. It tells your family and your doctor what to do if you can no longer say what you want. There are two main types of advance directives. You can change them any time that your wishes change. · A living will tells your family and your doctor your wishes about life support and other treatment. · A durable power of  for health care lets you name a person to make treatment decisions for you when you can't speak for yourself. This person is called a health care agent. If you do not have an advance directive, decisions about your medical care may be made by a doctor or a  who doesn't know you. It may help to think of an advance directive as a gift to the people who care for you. If you have one, they won't have to make tough decisions by themselves. Follow-up care is a key part of your treatment and safety. Be sure to make and go to all appointments, and call your doctor if you are having problems. It's also a good idea to know your test results and keep a list of the medicines you take. How can you care for yourself at home? · Discuss your wishes with your loved ones and your doctor. This way, there are no surprises. · Many states have a unique form. Or you might use a universal form that has been approved by many states. This kind of form can sometimes be completed and stored online. Your electronic copy will then be available wherever you have a connection to the Internet. In most cases, doctors will respect your wishes even if you have a form from a different state. · You don't need a  to do an advance directive. But you may want to get legal advice. · Think about these questions when you prepare an advance directive: ¨ Who do you want to make decisions about your medical care if you are not able to? Many people choose a family member or close friend. ¨ Do you know enough about life support methods that might be used? If not, talk to your doctor so you understand. ¨ What are you most afraid of that might happen? You might be afraid of having pain, losing your independence, or being kept alive by machines. ¨ Where would you prefer to die? Choices include your home, a hospital, or a nursing home. ¨ Would you like to have information about hospice care to support you and your family? ¨ Do you want to donate organs when you die? ¨ Do you want certain Latter day practices performed before you die? If so, put your wishes in the advance directive. · Read your advance directive every year, and make changes as needed. When should you call for help? Be sure to contact your doctor if you have any questions. Where can you learn more? Go to http://mahi-mara.info/. Enter R264 in the search box to learn more about \"Advance Directives: Care Instructions. \" Current as of: September 24, 2016 Content Version: 11.4 © 0183-2587 Bacula. Care instructions adapted under license by rollApp (which disclaims liability or warranty for this information). If you have questions about a medical condition or this instruction, always ask your healthcare professional. [address] [place] [address] any warranty or liability for your use of this information. Introducing [facility]! Dear Kimberly Purchase: Thank you for requesting a VideoGenie account. Our records indicate that you already have an active VideoGenie account. You can access your account anytime at [email or web address] Did you know that you can access your hospital and ER discharge instructions at any time in VideoGenie? You can also review all of your test results from your hospital stay or ER visit. Additional Information If you have questions, please visit the Frequently Asked Questions section of the VideoGenie website at [email or web address] Remember, VideoGenie is NOT to be used for urgent needs. For medical emergencies, dial 911. Now available from your iPhone and Android! Please provide this summary of care documentation to your next provider. Your primary care clinician is listed as Remy Pinto. If you have any questions after today's visit, please call [phone].

## 2018-06-18 NOTE — PATIENT INSTRUCTIONS
10 [facility]   Statement to Patients  April 1, 2014      In an effort to ensure the large volume of patient prescription refills is processed in the most efficient and expeditious manner, we are asking our patients to assist us by calling your Pharmacy for all prescription refills, this will include also your  Mail Order Pharmacy. The pharmacy will contact our office electronically to continue the refill process. Please do not wait until the last minute to call your pharmacy. We need at least 48 hours (2days) to fill prescriptions. We also encourage you to call your pharmacy before going to  your prescription to make sure it is ready. With regard to controlled substance prescription refill requests (narcotic refills) that need to be picked up at our office, we ask your cooperation by providing us with at least 72 hours (3days) notice that you will need a refill. We will not refill narcotic prescription refill requests after 4:00pm on any weekday, Monday through Thursday, or after 2:00pm on Fridays, or on the weekends. We encourage everyone to explore another way of getting your prescription refill request processed using Tailored, our patient web portal through our electronic medical record system. Tailored is an efficient and effective way to communicate your medication request directly to the office and  downloadable as an gregorio on your smart phone . Tailored also features a review functionality that allows you to view your medication list as well as leave messages for your physician. Are you ready to get connected? If so please review the attatched instructions or speak to any of our staff to get you set up right away! Thank you so much for your cooperation. Should you have any questions please contact our Practice Administrator.     The Physicians and Staff,  [facility]   Patient Instruction Plan/ Result Policy    If we have ordered testing for you, know that; \"NO NEWS IS GOOD NEWS! \" It is our policy that we know longer call patients with results, nor do we  give test results over the phone. We schedule follow up appointments so that your results can be discussed in person. This allows you to address any questions you have regarding the results. If something of concern is revealed on your test, we will contact you to discuss the matter and if needed schedule a sooner follow up appointment. Additionally, results may be found by using the My Chart feature and one of our patient service representatives at the  can give you instructions on how to access this feature to utilize our electronic medical record system. Thank you for your understanding. Learning About Living Luiz  What is a living will? A living will is a legal form you use to write down the kind of care you want at the end of your life. It is used by the health professionals who will treat you if you aren't able to decide for yourself. If you put your wishes in writing, your loved ones and others will know what kind of care you want. They won't need to guess. This can ease your mind and be helpful to others. A living will is not the same as an estate or property will. An estate will explains what you want to happen with your money and property after you die. Is a living will a legal document? A living will is a legal document. Each state has its own laws about living newman. If you move to another state, make sure that your living will is legal in the state where you now live. Or you might use a universal form that has been approved by many states. This kind of form can sometimes be completed and stored online. Your electronic copy will then be available wherever you have a connection to the Internet. In most cases, doctors will respect your wishes even if you have a form from a different state.   · You don't need an  to complete a living will. But legal advice can be helpful if your state's laws are unclear, your health history is complicated, or your family can't agree on what should be in your living will. · You can change your living will at any time. Some people find that their wishes about end-of-life care change as their health changes. · In addition to making a living will, think about completing a medical power of  form. This form lets you name the person you want to make end-of-life treatment decisions for you (your \"health care agent\") if you're not able to. Many hospitals and nursing homes will give you the forms you need to complete a living will and a medical power of . · Your living will is used only if you can't make or communicate decisions for yourself anymore. If you become able to make decisions again, you can accept or refuse any treatment, no matter what you wrote in your living will. · Your state may offer an online registry. This is a place where you can store your living will online so the doctors and nurses who need to treat you can find it right away. What should you think about when creating a living will? Talk about your end-of-life wishes with your family members and your doctor. Let them know what you want. That way the people making decisions for you won't be surprised by your choices. Think about these questions as you make your living will:  · Do you know enough about life support methods that might be used? If not, talk to your doctor so you know what might be done if you can't breathe on your own, your heart stops, or you're unable to swallow. · What things would you still want to be able to do after you receive life-support methods? Would you want to be able to walk? To speak? To eat on your own? To live without the help of machines? · If you have a choice, where do you want to be cared for? In your home? At a hospital or nursing home?   · Do you want certain Buddhism practices performed if you become very ill? · If you have a choice at the end of your life, where would you prefer to die? At home? In a hospital or nursing home? Somewhere else? · Would you prefer to be buried or cremated? · Do you want your organs to be donated after you die? What should you do with your living will? · Make sure that your family members and your health care agent have copies of your living will. · Give your doctor a copy of your living will to keep in your medical record. If you have more than one doctor, make sure that each one has a copy. · You may want to put a copy of your living will where it can be easily found. Where can you learn more? Go to http://mahi-mara.info/. Enter B629 in the search box to learn more about \"Learning About Living Marleni Zarate. \"  Current as of: September 24, 2016  Content Version: 11.4  © 9818-1575 [email or web address]. Care instructions adapted under license by hiyalife (which disclaims liability or warranty for this information). If you have questions about a medical condition or this instruction, always ask your healthcare professional. [place] [address] any warranty or liability for your use of this information. Advance Directives: Care Instructions  Your Care Instructions  An advance directive is a legal way to state your wishes at the end of your life. It tells your family and your doctor what to do if you can no longer say what you want. There are two main types of advance directives. You can change them any time that your wishes change. · A living will tells your family and your doctor your wishes about life support and other treatment. · A durable power of  for health care lets you name a person to make treatment decisions for you when you can't speak for yourself. This person is called a health care agent.   If you do not have an advance directive, decisions about your medical care may be made by a doctor or a  who doesn't know you. It may help to think of an advance directive as a gift to the people who care for you. If you have one, they won't have to make tough decisions by themselves. Follow-up care is a key part of your treatment and safety. Be sure to make and go to all appointments, and call your doctor if you are having problems. It's also a good idea to know your test results and keep a list of the medicines you take. How can you care for yourself at home? · Discuss your wishes with your loved ones and your doctor. This way, there are no surprises. · Many states have a unique form. Or you might use a universal form that has been approved by many states. This kind of form can sometimes be completed and stored online. Your electronic copy will then be available wherever you have a connection to the Internet. In most cases, doctors will respect your wishes even if you have a form from a different state. · You don't need a  to do an advance directive. But you may want to get legal advice. · Think about these questions when you prepare an advance directive:  ¨ Who do you want to make decisions about your medical care if you are not able to? Many people choose a family member or close friend. ¨ Do you know enough about life support methods that might be used? If not, talk to your doctor so you understand. ¨ What are you most afraid of that might happen? You might be afraid of having pain, losing your independence, or being kept alive by machines. ¨ Where would you prefer to die? Choices include your home, a hospital, or a nursing home. ¨ Would you like to have information about hospice care to support you and your family? ¨ Do you want to donate organs when you die? ¨ Do you want certain Judaism practices performed before you die? If so, put your wishes in the advance directive. · Read your advance directive every year, and make changes as needed.   When should you call for help?  Be sure to contact your doctor if you have any questions. Where can you learn more? Go to http://mahi-mara.info/. Enter R264 in the search box to learn more about \"Advance Directives: Care Instructions. \"  Current as of: September 24, 2016  Content Version: 11.4  © 1137-1293 Scanbuy. Care instructions adapted under license by Endorse (which disclaims liability or warranty for this information). If you have questions about a medical condition or this instruction, always ask your healthcare professional. [address] any warranty or liability for your use of this information.

## 2018-06-18 NOTE — PROGRESS NOTES
Neurology Progress Note    Patient ID:  Nadeem Soto  [id number]  45 y.o.  1965    HISTORY PROVIDED BY:  Patient      Chief Complaint: Numbness and pain  Subjective:    Ms. Robert Phillips is here for follow up today of numbness and pain. . Patient continues to have issues with pain. She has to take morphine and oxycodone. She has been having difficulty with pain management. Her previous pain management left the area. She is establishing with Kylie Barragan but does not have an appointment yet. It is scheduled for end of June. Patient reports that since her last visit she stated see a foot doctor. She is not clear if this was an orthopedist or podiatrist.  They noted that her left leg was more of an issue for her. They diagnosed her with peroneal neuropathy. Patient reports that she can loosen feeling in her legs and feel like she is going to fall but this is episodic. Since last visit we did refer to neurosurgery but she thinks it did not take her insurance so she did not go. [facility] There she had a spinal stimulator placed and they were unable to remove it. She did not have a good experience. The doctors there told her there is nothing really could do for her. Patient reports having multiple types of imaging, EMG/NCS, spinal stimulator, epidurals, etc.  She reports that she was given a work evaluation and told that she is so severe she cannot do a job even sitting. Recap:  She is having some headaches. Headaches were sharp shooting pain. Since last visit she has been off her Cymbalta and amitriptyline due to potential side effects of sleepiness and could not function with them. She also like her memory is off. Now she just continues on morphine, hydrocodone, Klonopin as needed, and trazodone. This is managed by pain management. Patient is feeling she is having pain in both of her feet, she questions if she fractured them. She does have frequent falls.   She continues to have neck and back pain. She has tremor in all 4 extremities. She is numbness in the hands especially. Neurosurgery did not feel like they have anything to offer her. Will send to [facility]. Patient will still get some leg weakness. She has no bowel or bladder incontinence. She will get dysphasia at times. She is previously failed gabapentin and steroids. She also did not do well with amitriptyline and Cymbalta. Patient pain management was to do a nerve ablation but she is concerned about trying this. Patient has not had any updated imaging or labs done recently. Objective:   ROS:  Per HPI-positive for numbness, headaches, and foot pain  Otherwise 12 point ROS was negative    Meds:  Current Outpatient Prescriptions on File Prior to Visit   Medication Sig Dispense Refill    oxyCODONE IR (ROXICODONE) 5 mg immediate release tablet TK 1 T PO Q 6 H FOR CHRONIC PAIN  0    naproxen sodium (ALEVE) 220 mg tablet Take 220 mg by mouth two (2) times daily (with meals).  ALPRAZolam (XANAX) 1 mg tablet TK 1 T PO TID PRN  1    clonazePAM (KLONOPIN) 1 mg disintegrating tablet PLACE 1 WAFTER ON TONGUE AND LET DISSOLVE TID PRN  1    morphine CR (MS CONTIN) 30 mg CR tablet TK 1 T PO BID  0    traZODone (DESYREL) 100 mg tablet TK 2 TS PO QHS  1    venlafaxine (EFFEXOR) 75 mg tablet TK 2 T IN THE MORNING AND 1 T PO AT NOON  1    tretinoin (RETIN-A) 0.025 % topical cream   0    dextroamphetamine-amphetamine (ADDERALL) 20 mg tablet TK 1 T PO AT 8 AM AND 1 TPO  AT NOON  0     No current facility-administered medications on file prior to visit.         Imaging:  CT head: Negative (I personally reviewed these images in PACS and this is my impression)  CT C-spine: Stable fusion C3 through 7  CT L-spine fusion L3 through S1 with severe stenosis at all levels but most significant at L1/2  EMG/NCS: Negative  Reviewed records in [facility] and media tab today    Lab Review   Results for orders placed or performed during the hospital encounter of 03/02/10   CULTURE, BLOOD, PAIRED   Result Value Ref Range    Specimen Description: BLOOD     Special Requests: NO SPECIAL REQUESTS     Culture result: NO GROWTH 5 DAYS     Report Status [id number] FINAL    HGB & HCT   Result Value Ref Range    HGB 9.9 (L) 11.5 - 16.0 g/dL    HCT 29.7 (L) 35.0 - 47.0 %   HGB & HCT   Result Value Ref Range    HGB 8.5 (L) 11.5 - 16.0 g/dL    HCT 26.1 (L) 35.0 - 47.0 %   HGB & HCT   Result Value Ref Range    HGB 9.7 (L) 11.5 - 16.0 g/dL    HCT 29.5 (L) 35.0 - 47.0 %   URINALYSIS W/ REFLEX CULTURE   Result Value Ref Range    Color YELLOW     Appearance CLEAR     Specific gravity 1.015 1.003 - 1.030      pH (UA) 6.0 5.0 - 8.0      Protein NEGATIVE  NEGATIVE MG/DL    Glucose NEGATIVE  NEGATIVE MG/DL    Ketone NEGATIVE  NEGATIVE MG/DL    Bilirubin NEGATIVE  NEGATIVE    Blood NEGATIVE  NEGATIVE    Urobilinogen 0.2 0.2 - 1.0 EU/DL    Nitrites NEGATIVE  NEGATIVE    Leukocyte Esterase NEGATIVE  NEGATIVE    UA:UC IF INDICATED CULTURE NOT INDICATED BY UA RESULT     WBC 0-4 0 - 4 /HPF    RBC 0-3 0 - 5 /HPF    Epithelial cells 0-5 0 - 5 /LPF    Bacteria NEGATIVE  NEGATIVE /HPF    Hyaline cast 0-2 0 - 2   INFLUENZA A & B AG   Result Value Ref Range    Influenza A Antigen NEGATIVE  NEGATIVE    Influenza B Antigen NEGATIVE  NEGATIVE   METABOLIC PANEL, COMPREHENSIVE   Result Value Ref Range    Sodium 143 136 - 145 MMOL/L    Potassium 3.4 (L) 3.5 - 5.1 MMOL/L    Chloride 105 97 - 108 MMOL/L    CO2 30 21 - 32 MMOL/L    Anion gap 8 5 - 15 mmol/L    Glucose 141 (H) 50 - 100 MG/DL    BUN 4 (L) 6 - 20 MG/DL    Creatinine 0.9 0.6 - 1.3 MG/DL    BUN/Creatinine ratio 4 (L) 12 - 20      GFR est AA >60 >60 ml/min/1.73m2    GFR est non-AA >60 >60 ml/min/1.73m2    Calcium 8.0 (L) 8.5 - 10.1 MG/DL    Bilirubin, total 0.2 0.2 - 1.0 MG/DL    ALT (SGPT) 32 30 - 65 U/L    AST (SGOT) 26 15 - 37 U/L    Alk.  phosphatase 56 50 - 136 U/L    Protein, total 5.2 (L) 6.4 - 8.2 g/dL    Albumin 2.7 (L) 3.5 - 5.0 g/dL    Globulin 2.5 2.0 - 4.0 g/dL    A-G Ratio 1.1 1.1 - 2.2     CBC W/O DIFF   Result Value Ref Range    WBC 10.9 3.6 - 11.0 K/uL    RBC 2.59 (L) 3.80 - 5.20 M/uL    HGB 8.3 (L) 11.5 - 16.0 g/dL    HCT 25.1 (L) 35.0 - 47.0 %    MCV 96.9 80.0 - 99.0 FL    MCH 32.0 26.0 - 34.0 PG    MCHC 33.1 30.0 - 36.5 g/dL    RDW 13.4 11.5 - 14.5 %    PLATELET 615 099 - 358 K/uL   CBC W/ AUTOMATED DIFF   Result Value Ref Range    WBC 10.6 3.6 - 11.0 K/uL    RBC 2.70 (L) 3.80 - 5.20 M/uL    HGB 8.7 (L) 11.5 - 16.0 g/dL    HCT 25.7 (L) 35.0 - 47.0 %    MCV 95.2 80.0 - 99.0 FL    MCH 32.2 26.0 - 34.0 PG    MCHC 33.9 30.0 - 36.5 g/dL    RDW 12.7 11.5 - 14.5 %    PLATELET 426 607 - 492 K/uL    NEUTROPHILS 67 32 - 75 %    LYMPHOCYTES 15 12 - 49 %    MONOCYTES 13 5 - 13 %    EOSINOPHILS 5 0 - 7 %    BASOPHILS 0 0 - 1 %    ABS. NEUTROPHILS 7.1 1.8 - 8.0 K/UL    ABS. LYMPHOCYTES 1.6 0.8 - 3.5 K/UL    ABS. MONOCYTES 1.4 (H) 0.0 - 1.0 K/UL    ABS. EOSINOPHILS 0.5 (H) 0.0 - 0.4 K/UL    ABS. BASOPHILS 0.0 0.0 - 0.1 K/UL     EMG/NCS:  Findings:    1. EMG assessment of all 4 extremities and affiliated paraspinals failed to confidently delineate acute denervation or chronic denervation/reinnervation features or myopathic potentials. The uniform theme among motor recruitment was no realistic or sustained recruitment effort obtained at any of the muscle encounters. At best, muscle recruitment was easily less than 20%. 2.  Nerve conduction portion was normal except for the isolated finding of slight delay in the right ulnar motor latency at the wrist and diminished nerve conduction velocity in the same ulnar nerve above elbow segment with maintenance of motor amplitude above and below elbow. Impression:    1. No clearly defined neuropathic or myopathic disease uncovered with suboptimal patient effort at all muscles tested.     2.  Subtle conduction block of right ulnar motor nerve at the wrist and a possible suggestion of right ulnar nerve compression in the above elbow segment of uncertain if any significance. Exam:  Visit Vitals    /72    Pulse 89    Resp 16    Ht 5' 2\" (1.575 m)    Wt 71.2 kg (157 lb)    SpO2 90%    BMI 28.72 kg/m2     Gen: Well developed  CV: RRR  Lungs: non labored breathing  Abd: non distending  Neuro: A&O x 3, no dysarthria or aphasia  CN II-XII: Right pupil 5 mm, left pupil 3 mm this is the same in light and dark, EOMI, face symmetric, tongue/palate midline  Motor: strength 5/5 all four ext except some mild weakness right IHM  Sensory: intact to LT  Gait: Antalgic gait    Assessment:   Zara Rivera is a 45 y.o. female who presents for follow up of generalized numbness. 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  She does have lumbar stenosis on updated imaging. We will send to [address] [place] [address] neurosurgery for evaluation of this. I do not think her previous referral actually went through. She is also having numbness and neuropathic pain. I think this is secondary to the lumbar disc disease. Additionally she does have carpal tunnel syndrome. She is not interested in any neuropathic pain medications. Will make sure she establish with pain management for medication. Plan:   1. CT head/C-spine/L-spine as above  2. Off amitriptyline and Cymbalta. Patient does not want to start any new medications. She has previously failed gabapentin and steroids. 3.  Referral to neurosurgery at [address] [place] [address] for CTS release and lumbar stenosis- will send referral today  4. Encouraged patient to keep her pain management appointment as I do not do narcotics. Patient has an appointment set up with Tennille Staton  5. Discussed with patient we can repeat imaging and testing she has previously had but I do not think it will add to management  6. Discussed that in terms of peroneal neuropathy patient should avoid crossing her legs.   Her previous EMG did not show any indication of peroneal neuropathy. Follow-up TBD    Signed:  Rosalina Oseguera MD  6/18/2018    Medications and side effects discussed with patient in detail. With any new medications prescribed, patient was given instructions on administration and side effects. Written medication information was provided to the patient as well. This note was created using voice recognition software. Despite editing, there may be syntax errors. This note will not be viewable in [address]

## 2018-06-18 NOTE — PROGRESS NOTES
Chief Complaint   Patient presents with    Peripheral Neuropathy     hands/feet     1. Have you been to the ER, urgent care clinic since your last visit? Hospitalized since your last visit? No    2. Have you seen or consulted any other health care providers outside of the [address] [place] [address] since your last visit? Include any pap smears or colon screening.  No       Reviewed record in preparation for visit and have necessary documentation  Pt did not bring medication to office visit for review  Medication list reviewed and reconciled with patient  Information was given to pt on Advanced Directives, Living Will  opportunity was given for questions

## 2018-07-03 ENCOUNTER — ED HISTORICAL/CONVERTED ENCOUNTER (OUTPATIENT)
Dept: OTHER | Age: 53
End: 2018-07-03

## 2018-11-16 ENCOUNTER — OP HISTORICAL/CONVERTED ENCOUNTER (OUTPATIENT)
Dept: OTHER | Age: 53
End: 2018-11-16

## 2020-01-14 ENCOUNTER — ED HISTORICAL/CONVERTED ENCOUNTER (OUTPATIENT)
Dept: OTHER | Age: 55
End: 2020-01-14

## 2024-11-13 ENCOUNTER — TRANSCRIBE ORDERS (OUTPATIENT)
Facility: HOSPITAL | Age: 59
End: 2024-11-13

## 2024-11-13 DIAGNOSIS — Z96.652 STATUS POST TOTAL LEFT KNEE REPLACEMENT: Primary | ICD-10-CM

## 2024-12-04 ENCOUNTER — HOSPITAL ENCOUNTER (OUTPATIENT)
Facility: HOSPITAL | Age: 59
Discharge: HOME | End: 2024-12-07
Attending: ORTHOPAEDIC SURGERY
Payer: MEDICARE

## 2024-12-04 DIAGNOSIS — Z96.652 STATUS POST TOTAL LEFT KNEE REPLACEMENT: ICD-10-CM

## 2024-12-04 PROCEDURE — 73700 CT LOWER EXTREMITY W/O DYE: CPT

## 2025-02-24 ENCOUNTER — HOSPITAL ENCOUNTER (OUTPATIENT)
Facility: HOSPITAL | Age: 60
Discharge: HOME | End: 2025-02-27
Payer: MEDICARE

## 2025-02-24 DIAGNOSIS — M17.11 PRIMARY OSTEOARTHRITIS OF RIGHT KNEE: ICD-10-CM

## 2025-02-24 PROCEDURE — 73700 CT LOWER EXTREMITY W/O DYE: CPT

## 2025-07-07 ENCOUNTER — HOSPITAL ENCOUNTER (OUTPATIENT)
Facility: HOSPITAL | Age: 60
Discharge: HOME | End: 2025-07-10
Attending: ORTHOPAEDIC SURGERY
Payer: MEDICARE

## 2025-07-07 DIAGNOSIS — M25.512 LEFT SHOULDER PAIN, UNSPECIFIED CHRONICITY: ICD-10-CM

## 2025-07-07 PROCEDURE — 6360000002 HC RX W HCPCS: Performed by: ORTHOPAEDIC SURGERY

## 2025-07-07 PROCEDURE — 20610 DRAIN/INJ JOINT/BURSA W/O US: CPT

## 2025-07-07 PROCEDURE — 6360000004 HC RX CONTRAST MEDICATION: Performed by: ORTHOPAEDIC SURGERY

## 2025-07-07 RX ORDER — LIDOCAINE HYDROCHLORIDE 10 MG/ML
10 INJECTION, SOLUTION EPIDURAL; INFILTRATION; INTRACAUDAL; PERINEURAL ONCE
Status: COMPLETED | OUTPATIENT
Start: 2025-07-07 | End: 2025-07-07

## 2025-07-07 RX ORDER — BUPIVACAINE HYDROCHLORIDE 2.5 MG/ML
30 INJECTION, SOLUTION EPIDURAL; INFILTRATION; INTRACAUDAL; PERINEURAL ONCE
Status: COMPLETED | OUTPATIENT
Start: 2025-07-07 | End: 2025-07-07

## 2025-07-07 RX ORDER — TRIAMCINOLONE ACETONIDE 40 MG/ML
40 INJECTION, SUSPENSION INTRA-ARTICULAR; INTRAMUSCULAR ONCE
Status: COMPLETED | OUTPATIENT
Start: 2025-07-07 | End: 2025-07-07

## 2025-07-07 RX ADMIN — IOHEXOL 10 ML: 180 INJECTION INTRAVENOUS at 12:57

## 2025-07-07 RX ADMIN — TRIAMCINOLONE ACETONIDE 40 MG: 40 INJECTION, SUSPENSION INTRA-ARTICULAR; INTRAMUSCULAR at 12:57

## 2025-07-07 RX ADMIN — BUPIVACAINE HYDROCHLORIDE 75 MG: 2.5 INJECTION, SOLUTION EPIDURAL; INFILTRATION; INTRACAUDAL; PERINEURAL at 12:58

## 2025-07-07 RX ADMIN — LIDOCAINE HYDROCHLORIDE 5 ML: 10 INJECTION, SOLUTION EPIDURAL; INFILTRATION; INTRACAUDAL; PERINEURAL at 13:00
